# Patient Record
Sex: MALE | Race: BLACK OR AFRICAN AMERICAN | NOT HISPANIC OR LATINO | URBAN - METROPOLITAN AREA
[De-identification: names, ages, dates, MRNs, and addresses within clinical notes are randomized per-mention and may not be internally consistent; named-entity substitution may affect disease eponyms.]

---

## 2021-08-14 ENCOUNTER — HOSPITAL ENCOUNTER (EMERGENCY)
Facility: HOSPITAL | Age: 41
Discharge: HOME | End: 2021-08-14
Attending: FAMILY MEDICINE
Payer: MEDICAID

## 2021-08-14 VITALS
DIASTOLIC BLOOD PRESSURE: 76 MMHG | RESPIRATION RATE: 18 BRPM | HEART RATE: 59 BPM | TEMPERATURE: 99 F | HEIGHT: 73 IN | WEIGHT: 189 LBS | BODY MASS INDEX: 25.05 KG/M2 | SYSTOLIC BLOOD PRESSURE: 110 MMHG | OXYGEN SATURATION: 99 %

## 2021-08-14 DIAGNOSIS — U07.1 COVID-19 VIRUS DETECTED: ICD-10-CM

## 2021-08-14 DIAGNOSIS — U07.1 COVID-19 VIRUS DETECTED: Primary | ICD-10-CM

## 2021-08-14 LAB
CTP QC/QA: YES
SARS-COV-2 RDRP RESP QL NAA+PROBE: POSITIVE

## 2021-08-14 PROCEDURE — 99282 EMERGENCY DEPT VISIT SF MDM: CPT | Mod: ER

## 2021-08-14 PROCEDURE — U0002 COVID-19 LAB TEST NON-CDC: HCPCS | Mod: ER | Performed by: FAMILY MEDICINE

## 2022-06-26 ENCOUNTER — HOSPITAL ENCOUNTER (EMERGENCY)
Facility: HOSPITAL | Age: 42
Discharge: HOME | End: 2022-06-26
Attending: EMERGENCY MEDICINE
Payer: MEDICAID

## 2022-06-26 ENCOUNTER — HOSPITAL ENCOUNTER (OUTPATIENT)
Facility: HOSPITAL | Age: 42
Discharge: HOME | End: 2022-06-27
Attending: EMERGENCY MEDICINE | Admitting: ORTHOPAEDIC SURGERY
Payer: MEDICAID

## 2022-06-26 VITALS
OXYGEN SATURATION: 100 % | BODY MASS INDEX: 23.19 KG/M2 | SYSTOLIC BLOOD PRESSURE: 110 MMHG | WEIGHT: 175 LBS | RESPIRATION RATE: 17 BRPM | HEIGHT: 73 IN | TEMPERATURE: 98 F | HEART RATE: 49 BPM | DIASTOLIC BLOOD PRESSURE: 62 MMHG

## 2022-06-26 DIAGNOSIS — S52.501B TYPE I OR II OPEN FRACTURE OF DISTAL END OF RIGHT RADIUS, UNSPECIFIED FRACTURE MORPHOLOGY, INITIAL ENCOUNTER: Primary | ICD-10-CM

## 2022-06-26 DIAGNOSIS — S01.112A LACERATION OF LEFT EYEBROW, INITIAL ENCOUNTER: ICD-10-CM

## 2022-06-26 DIAGNOSIS — S52.591A OTHER CLOSED FRACTURE OF DISTAL END OF RIGHT RADIUS, INITIAL ENCOUNTER: Primary | ICD-10-CM

## 2022-06-26 DIAGNOSIS — S61.511A WRIST LACERATION, RIGHT, INITIAL ENCOUNTER: ICD-10-CM

## 2022-06-26 LAB
ALBUMIN SERPL BCP-MCNC: 4.5 G/DL (ref 3.5–5.2)
ALP SERPL-CCNC: 51 U/L (ref 38–126)
ALT SERPL W/O P-5'-P-CCNC: 30 U/L (ref 10–44)
ANION GAP SERPL CALC-SCNC: 9 MMOL/L (ref 8–16)
AST SERPL-CCNC: 45 U/L (ref 15–46)
BASOPHILS # BLD AUTO: 0.03 K/UL (ref 0–0.2)
BASOPHILS NFR BLD: 0.3 % (ref 0–1.9)
BILIRUB SERPL-MCNC: 0.4 MG/DL (ref 0.1–1)
CALCIUM SERPL-MCNC: 9 MG/DL (ref 8.7–10.5)
CHLORIDE SERPL-SCNC: 105 MMOL/L (ref 95–110)
CO2 SERPL-SCNC: 28 MMOL/L (ref 23–29)
CREAT SERPL-MCNC: 0.99 MG/DL (ref 0.5–1.4)
DIFFERENTIAL METHOD: ABNORMAL
EOSINOPHIL # BLD AUTO: 0.1 K/UL (ref 0–0.5)
EOSINOPHIL NFR BLD: 0.9 % (ref 0–8)
ERYTHROCYTE [DISTWIDTH] IN BLOOD BY AUTOMATED COUNT: 12 % (ref 11.5–14.5)
EST. GFR  (AFRICAN AMERICAN): >60 ML/MIN/1.73 M^2
EST. GFR  (NON AFRICAN AMERICAN): >60 ML/MIN/1.73 M^2
GLUCOSE SERPL-MCNC: 75 MG/DL (ref 70–110)
HCT VFR BLD AUTO: 37.8 % (ref 40–54)
HGB BLD-MCNC: 12.6 G/DL (ref 14–18)
IMM GRANULOCYTES # BLD AUTO: 0.04 K/UL (ref 0–0.04)
IMM GRANULOCYTES NFR BLD AUTO: 0.4 % (ref 0–0.5)
LYMPHOCYTES # BLD AUTO: 3 K/UL (ref 1–4.8)
LYMPHOCYTES NFR BLD: 33.3 % (ref 18–48)
MCH RBC QN AUTO: 30.5 PG (ref 27–31)
MCHC RBC AUTO-ENTMCNC: 33.3 G/DL (ref 32–36)
MCV RBC AUTO: 92 FL (ref 82–98)
MONOCYTES # BLD AUTO: 0.5 K/UL (ref 0.3–1)
MONOCYTES NFR BLD: 5.4 % (ref 4–15)
NEUTROPHILS # BLD AUTO: 5.3 K/UL (ref 1.8–7.7)
NEUTROPHILS NFR BLD: 59.7 % (ref 38–73)
NRBC BLD-RTO: 0 /100 WBC
PLATELET # BLD AUTO: 225 K/UL (ref 150–450)
PMV BLD AUTO: 10.5 FL (ref 9.2–12.9)
POTASSIUM SERPL-SCNC: 3.5 MMOL/L (ref 3.5–5.1)
PROT SERPL-MCNC: 7.2 G/DL (ref 6–8.4)
RBC # BLD AUTO: 4.13 M/UL (ref 4.6–6.2)
SARS-COV-2 RDRP RESP QL NAA+PROBE: NEGATIVE
SODIUM SERPL-SCNC: 142 MMOL/L (ref 136–145)
UUN UR-MCNC: 15 MG/DL (ref 2–20)
WBC # BLD AUTO: 8.89 K/UL (ref 3.9–12.7)

## 2022-06-26 PROCEDURE — 12011 RPR F/E/E/N/L/M 2.5 CM/<: CPT | Mod: ER

## 2022-06-26 PROCEDURE — G0378 HOSPITAL OBSERVATION PER HR: HCPCS

## 2022-06-26 PROCEDURE — U0002 COVID-19 LAB TEST NON-CDC: HCPCS | Mod: ER | Performed by: EMERGENCY MEDICINE

## 2022-06-26 PROCEDURE — 96375 TX/PRO/DX INJ NEW DRUG ADDON: CPT | Mod: 59

## 2022-06-26 PROCEDURE — 80053 COMPREHEN METABOLIC PANEL: CPT | Mod: ER | Performed by: EMERGENCY MEDICINE

## 2022-06-26 PROCEDURE — 12002 RPR S/N/AX/GEN/TRNK2.6-7.5CM: CPT

## 2022-06-26 PROCEDURE — 29105 APPLICATION LONG ARM SPLINT: CPT | Mod: RT,ER

## 2022-06-26 PROCEDURE — 96365 THER/PROPH/DIAG IV INF INIT: CPT | Mod: ER,59

## 2022-06-26 PROCEDURE — 99285 EMERGENCY DEPT VISIT HI MDM: CPT | Mod: 25,27,ER

## 2022-06-26 PROCEDURE — 96375 TX/PRO/DX INJ NEW DRUG ADDON: CPT | Mod: ER

## 2022-06-26 PROCEDURE — 99282 PR EMERGENCY DEPT VISIT,LEVEL II: ICD-10-PCS | Mod: ,,, | Performed by: EMERGENCY MEDICINE

## 2022-06-26 PROCEDURE — 63600175 PHARM REV CODE 636 W HCPCS: Mod: ER | Performed by: EMERGENCY MEDICINE

## 2022-06-26 PROCEDURE — 25000003 PHARM REV CODE 250: Mod: ER | Performed by: EMERGENCY MEDICINE

## 2022-06-26 PROCEDURE — 63600175 PHARM REV CODE 636 W HCPCS: Performed by: STUDENT IN AN ORGANIZED HEALTH CARE EDUCATION/TRAINING PROGRAM

## 2022-06-26 PROCEDURE — 25000003 PHARM REV CODE 250

## 2022-06-26 PROCEDURE — 99282 EMERGENCY DEPT VISIT SF MDM: CPT | Mod: ,,, | Performed by: EMERGENCY MEDICINE

## 2022-06-26 PROCEDURE — 90715 TDAP VACCINE 7 YRS/> IM: CPT | Mod: ER | Performed by: EMERGENCY MEDICINE

## 2022-06-26 PROCEDURE — 85025 COMPLETE CBC W/AUTO DIFF WBC: CPT | Mod: ER | Performed by: EMERGENCY MEDICINE

## 2022-06-26 PROCEDURE — 90471 IMMUNIZATION ADMIN: CPT | Mod: ER | Performed by: EMERGENCY MEDICINE

## 2022-06-26 PROCEDURE — 99285 EMERGENCY DEPT VISIT HI MDM: CPT | Mod: 25

## 2022-06-26 RX ORDER — ACETAMINOPHEN 325 MG/1
650 TABLET ORAL EVERY 8 HOURS PRN
Status: DISCONTINUED | OUTPATIENT
Start: 2022-06-26 | End: 2022-06-28 | Stop reason: HOSPADM

## 2022-06-26 RX ORDER — SODIUM CHLORIDE 0.9 % (FLUSH) 0.9 %
10 SYRINGE (ML) INJECTION
Status: DISCONTINUED | OUTPATIENT
Start: 2022-06-26 | End: 2022-06-28 | Stop reason: HOSPADM

## 2022-06-26 RX ORDER — SODIUM CHLORIDE 9 MG/ML
1000 INJECTION, SOLUTION INTRAVENOUS
Status: COMPLETED | OUTPATIENT
Start: 2022-06-26 | End: 2022-06-26

## 2022-06-26 RX ORDER — CEFAZOLIN SODIUM 1 G/50ML
1 SOLUTION INTRAVENOUS
Status: COMPLETED | OUTPATIENT
Start: 2022-06-26 | End: 2022-06-26

## 2022-06-26 RX ORDER — FENTANYL CITRATE 50 UG/ML
25 INJECTION, SOLUTION INTRAMUSCULAR; INTRAVENOUS
Status: COMPLETED | OUTPATIENT
Start: 2022-06-26 | End: 2022-06-26

## 2022-06-26 RX ORDER — LIDOCAINE HYDROCHLORIDE 10 MG/ML
10 INJECTION, SOLUTION EPIDURAL; INFILTRATION; INTRACAUDAL; PERINEURAL
Status: COMPLETED | OUTPATIENT
Start: 2022-06-26 | End: 2022-06-26

## 2022-06-26 RX ORDER — LIDOCAINE HYDROCHLORIDE 10 MG/ML
1 INJECTION, SOLUTION EPIDURAL; INFILTRATION; INTRACAUDAL; PERINEURAL ONCE
Status: COMPLETED | OUTPATIENT
Start: 2022-06-27 | End: 2022-06-27

## 2022-06-26 RX ORDER — ONDANSETRON 2 MG/ML
4 INJECTION INTRAMUSCULAR; INTRAVENOUS
Status: COMPLETED | OUTPATIENT
Start: 2022-06-26 | End: 2022-06-26

## 2022-06-26 RX ORDER — ONDANSETRON 8 MG/1
8 TABLET, ORALLY DISINTEGRATING ORAL EVERY 8 HOURS PRN
Status: DISCONTINUED | OUTPATIENT
Start: 2022-06-26 | End: 2022-06-28 | Stop reason: HOSPADM

## 2022-06-26 RX ORDER — OXYCODONE HYDROCHLORIDE 5 MG/1
5 TABLET ORAL EVERY 4 HOURS PRN
Status: DISCONTINUED | OUTPATIENT
Start: 2022-06-26 | End: 2022-06-28 | Stop reason: HOSPADM

## 2022-06-26 RX ORDER — TALC
6 POWDER (GRAM) TOPICAL NIGHTLY PRN
Status: DISCONTINUED | OUTPATIENT
Start: 2022-06-26 | End: 2022-06-28 | Stop reason: HOSPADM

## 2022-06-26 RX ORDER — ACETAMINOPHEN 325 MG/1
650 TABLET ORAL EVERY 4 HOURS PRN
Status: DISCONTINUED | OUTPATIENT
Start: 2022-06-26 | End: 2022-06-28 | Stop reason: HOSPADM

## 2022-06-26 RX ORDER — OXYCODONE HYDROCHLORIDE 10 MG/1
10 TABLET ORAL EVERY 4 HOURS PRN
Status: DISCONTINUED | OUTPATIENT
Start: 2022-06-26 | End: 2022-06-28 | Stop reason: HOSPADM

## 2022-06-26 RX ORDER — LIDOCAINE HYDROCHLORIDE 10 MG/ML
10 INJECTION, SOLUTION EPIDURAL; INFILTRATION; INTRACAUDAL; PERINEURAL
Status: DISPENSED | OUTPATIENT
Start: 2022-06-26 | End: 2022-06-27

## 2022-06-26 RX ORDER — MORPHINE SULFATE 4 MG/ML
4 INJECTION, SOLUTION INTRAMUSCULAR; INTRAVENOUS EVERY 4 HOURS PRN
Status: DISCONTINUED | OUTPATIENT
Start: 2022-06-26 | End: 2022-06-28 | Stop reason: HOSPADM

## 2022-06-26 RX ORDER — MORPHINE SULFATE 4 MG/ML
4 INJECTION, SOLUTION INTRAMUSCULAR; INTRAVENOUS
Status: COMPLETED | OUTPATIENT
Start: 2022-06-26 | End: 2022-06-26

## 2022-06-26 RX ORDER — CEFAZOLIN SODIUM/D5W 2 G/100 ML
2 PLASTIC BAG, INJECTION (ML) INTRAVENOUS
Status: COMPLETED | OUTPATIENT
Start: 2022-06-27 | End: 2022-06-27

## 2022-06-26 RX ORDER — LIDOCAINE HYDROCHLORIDE 10 MG/ML
20 INJECTION INFILTRATION; PERINEURAL ONCE
Status: DISCONTINUED | OUTPATIENT
Start: 2022-06-26 | End: 2022-06-26 | Stop reason: SDUPTHER

## 2022-06-26 RX ADMIN — MORPHINE SULFATE 4 MG: 4 INJECTION INTRAVENOUS at 06:06

## 2022-06-26 RX ADMIN — SODIUM CHLORIDE 1000 ML: 0.9 INJECTION, SOLUTION INTRAVENOUS at 06:06

## 2022-06-26 RX ADMIN — ONDANSETRON HYDROCHLORIDE 4 MG: 2 INJECTION, SOLUTION INTRAMUSCULAR; INTRAVENOUS at 06:06

## 2022-06-26 RX ADMIN — CEFAZOLIN SODIUM 1 G: 1 SOLUTION INTRAVENOUS at 07:06

## 2022-06-26 RX ADMIN — FENTANYL CITRATE 25 MCG: 50 INJECTION INTRAMUSCULAR; INTRAVENOUS at 09:06

## 2022-06-26 RX ADMIN — LIDOCAINE HYDROCHLORIDE 100 MG: 10 INJECTION, SOLUTION EPIDURAL; INFILTRATION; INTRACAUDAL at 06:06

## 2022-06-26 RX ADMIN — TETANUS TOXOID, REDUCED DIPHTHERIA TOXOID AND ACELLULAR PERTUSSIS VACCINE, ADSORBED 0.5 ML: 5; 2.5; 8; 8; 2.5 SUSPENSION INTRAMUSCULAR at 07:06

## 2022-06-26 RX ADMIN — OXYCODONE HYDROCHLORIDE 10 MG: 10 TABLET ORAL at 11:06

## 2022-06-26 NOTE — ED PROVIDER NOTES
"Encounter Date: 6/26/2022       History     Chief Complaint   Patient presents with    Laceration     Pt states "I was at work and I wasn't even using the . I just turned it on and it's a big wheel and it cut me." Pt with laceration to left eyebrow and right wrist. Denies using blood thinners. Pt diaphoretic. Pt AAOX4. 2+ pulses noted. Pressure dressing noted.      Patient is a 42-year-old male presents to the ED with complaint of laceration.  Patient states that he was using a  that "got away from me" and allegedly struck him to the left eyebrow and the right wrist approximately 20 minutes prior to arrival. He denies any vision change, eye pain or FB sensatin to the L eye. He does endorse pain and laceration to the distal R forearm but he denies any numbness or tingling or an inability to move his R wrist or the fingers on his R hand. He does not recall when he received his last tetanus. He denies any other complaints. The patient denies any use of blood thinning medications, significant pmhx.         Review of patient's allergies indicates:  No Known Allergies  History reviewed. No pertinent past medical history.  History reviewed. No pertinent surgical history.  History reviewed. No pertinent family history.  Social History     Tobacco Use    Smoking status: Current Every Day Smoker     Packs/day: 1.00    Smokeless tobacco: Never Used   Substance Use Topics    Alcohol use: Never    Drug use: Yes     Types: Marijuana     Review of Systems   Constitutional: Negative for chills and fever.   Eyes: Negative for photophobia, pain, discharge, redness, itching and visual disturbance.   Skin: Positive for wound.   Neurological: Negative for dizziness, weakness and numbness.       Physical Exam     Initial Vitals [06/26/22 1815]   BP Pulse Resp Temp SpO2   111/71 (!) 58 (!) 22 98.5 °F (36.9 °C) 96 %      MAP       --         Physical Exam    Constitutional: He appears well-developed and well-nourished. "   HENT:   Head: Normocephalic.       Approximate 2 cm linear laceration superior to the L orbit; well-approximated   Eyes: Conjunctivae and EOM are normal. Pupils are equal, round, and reactive to light.   Neck: Neck supple.   Normal range of motion.  Musculoskeletal:        Arms:       Cervical back: Normal range of motion and neck supple.      Comments: Approximate 5 cm linear laceration noted to the R wrist; no active bleeding;  no tendon involvement; scant muscle tear; no FB; the R radial pulse is 2+; patient able to make okay sign and thumbs up sign; 2 point discrimination intact     Neurological: He is alert and oriented to person, place, and time. He has normal strength. GCS score is 15. GCS eye subscore is 4. GCS verbal subscore is 5. GCS motor subscore is 6.         ED Course   Procedures  Labs Reviewed   CBC W/ AUTO DIFFERENTIAL - Abnormal; Notable for the following components:       Result Value    RBC 4.13 (*)     Hemoglobin 12.6 (*)     Hematocrit 37.8 (*)     All other components within normal limits   COMPREHENSIVE METABOLIC PANEL   SARS-COV-2 RNA AMPLIFICATION, QUAL          Imaging Results          X-Ray Wrist Complete Right (Final result)  Result time 06/26/22 18:45:29    Final result by Damián Walker MD (06/26/22 18:45:29)                 Impression:      As above      Electronically signed by: Aaron Steel  Date:    06/26/2022  Time:    18:45             Narrative:    EXAMINATION:  XR WRIST COMPLETE 3 VIEWS RIGHT    CLINICAL HISTORY:  Laceration without foreign body of right wrist, initial encounter    TECHNIQUE:  PA, lateral, and oblique views of the right wrist were performed.    COMPARISON:  None    FINDINGS:  Comminuted fracture of the distal radius along the radial side.                                 Medications   ceFAZolin (ANCEF) 1 gram in dextrose 5 % 50 mL IVPB (premix) (1 g Intravenous New Bag 6/26/22 1912)   Tdap (BOOSTRIX) vaccine injection 0.5 mL (has no administration in  time range)   0.9%  NaCl infusion (1,000 mLs Intravenous New Bag 6/26/22 1847)   morphine injection 4 mg (4 mg Intravenous Given 6/26/22 1849)   ondansetron injection 4 mg (4 mg Intravenous Given 6/26/22 1849)   LIDOcaine (PF) 10 mg/ml (1%) injection 100 mg (100 mg Infiltration Given 6/26/22 1855)     Medical Decision Making:   Clinical Tests:   Lab Tests: Ordered and Reviewed  Radiological Study: Reviewed and Ordered  ED Management:  - patient presents to the ED with laceration to the left eyebrow and right wrist status post  accident; patient states that a  cut him to the right radius and the left eyebrow; the patient has an approximate 5 cm laceration to the distal right radius; plain radiograph of the right wrist demonstrates a comminuted distal radial fracture; as this is an open fracture, patient will need to be evaluated by Orthopedic surgery; at the present time, we are without orthopedic surgery coverage in the [facility] and at [facility]; discussed case with the [facility] referral center opening touch with the current on-call orthopedic surgeon, Dr. Martell, who has accept the patient as a ED to ED transfer; facial laceration repaired; patient administered Ancef; right forearm laceration left open but irrigated and dressed appropriately; splint applied prior to transfer; patient stable for transfer; he is in agreement plan for transfer and further evaluation Orthopedic surgery as indicated above  Other:   I have discussed this case with another health care provider.       <> Summary of the Discussion: Open fx; discussed with orthopedic sx.              ED Course as of 06/26/22 1932   [place] Jun 26, 2022   1901 Discussed case with on-call orthopedic surgery, Dr. Martell, who will accept the patient as an ED-to-ED transfer  [LC]      ED Course User Index  [LC] Andrew Harrison MD             Clinical Impression:   Final diagnoses:  [A97.452A] Wrist laceration, right,  initial encounter  [S01.112A] Laceration of left eyebrow, initial encounter  [S52.591A] Other closed fracture of distal end of right radius, initial encounter (Primary)          ED Disposition Condition    Transfer to Another Facility Stable                     Andrew Harrison MD  06/26/22 1932       Andrew Harrison MD  06/26/22 1933

## 2022-06-27 ENCOUNTER — TELEPHONE (OUTPATIENT)
Dept: SPORTS MEDICINE | Facility: CLINIC | Age: 42
End: 2022-06-27
Payer: MEDICAID

## 2022-06-27 VITALS
HEIGHT: 73 IN | WEIGHT: 174.19 LBS | TEMPERATURE: 97 F | RESPIRATION RATE: 17 BRPM | HEART RATE: 45 BPM | SYSTOLIC BLOOD PRESSURE: 113 MMHG | OXYGEN SATURATION: 100 % | BODY MASS INDEX: 23.09 KG/M2 | DIASTOLIC BLOOD PRESSURE: 67 MMHG

## 2022-06-27 PROBLEM — S52.501B OPEN FRACTURE OF DISTAL END OF RIGHT RADIUS: Status: ACTIVE | Noted: 2022-06-27

## 2022-06-27 PROCEDURE — G0378 HOSPITAL OBSERVATION PER HR: HCPCS

## 2022-06-27 PROCEDURE — 96365 THER/PROPH/DIAG IV INF INIT: CPT | Performed by: EMERGENCY MEDICINE

## 2022-06-27 PROCEDURE — 25000003 PHARM REV CODE 250

## 2022-06-27 PROCEDURE — 63600175 PHARM REV CODE 636 W HCPCS

## 2022-06-27 PROCEDURE — 96366 THER/PROPH/DIAG IV INF ADDON: CPT | Performed by: EMERGENCY MEDICINE

## 2022-06-27 RX ORDER — METHOCARBAMOL 500 MG/1
500 TABLET, FILM COATED ORAL 4 TIMES DAILY
Status: DISCONTINUED | OUTPATIENT
Start: 2022-06-27 | End: 2022-06-28 | Stop reason: HOSPADM

## 2022-06-27 RX ORDER — HYDROCODONE BITARTRATE AND ACETAMINOPHEN 5; 325 MG/1; MG/1
1 TABLET ORAL EVERY 6 HOURS PRN
Qty: 12 TABLET | Refills: 0 | Status: SHIPPED | OUTPATIENT
Start: 2022-06-27

## 2022-06-27 RX ORDER — SULFAMETHOXAZOLE AND TRIMETHOPRIM 800; 160 MG/1; MG/1
1 TABLET ORAL 2 TIMES DAILY
Qty: 14 TABLET | Refills: 0 | Status: SHIPPED | OUTPATIENT
Start: 2022-06-27 | End: 2022-07-04

## 2022-06-27 RX ADMIN — Medication 2 G: at 02:06

## 2022-06-27 RX ADMIN — OXYCODONE HYDROCHLORIDE 10 MG: 10 TABLET ORAL at 03:06

## 2022-06-27 RX ADMIN — Medication 2 G: at 12:06

## 2022-06-27 RX ADMIN — OXYCODONE HYDROCHLORIDE 10 MG: 10 TABLET ORAL at 08:06

## 2022-06-27 RX ADMIN — METHOCARBAMOL 500 MG: 500 TABLET ORAL at 08:06

## 2022-06-27 RX ADMIN — METHOCARBAMOL 500 MG: 500 TABLET ORAL at 12:06

## 2022-06-27 RX ADMIN — Medication 2 G: at 08:06

## 2022-06-27 RX ADMIN — METHOCARBAMOL 500 MG: 500 TABLET ORAL at 05:06

## 2022-06-27 RX ADMIN — OXYCODONE HYDROCHLORIDE 10 MG: 10 TABLET ORAL at 09:06

## 2022-06-27 RX ADMIN — METHOCARBAMOL 500 MG: 500 TABLET ORAL at 09:06

## 2022-06-27 RX ADMIN — LIDOCAINE HYDROCHLORIDE 10 MG: 10 INJECTION, SOLUTION EPIDURAL; INFILTRATION; INTRACAUDAL; PERINEURAL at 12:06

## 2022-06-27 NOTE — ASSESSMENT & PLAN NOTE
Thomas Caal is a 42 y.o. male with a grade 2 open fracture of his right distal radius.  He is neurovascularly intact.  He received Ancef and a tetanus booster at the outside hospital.  He underwent irrigation of his wound at the outside hospital prior to transfer.  He does not underwent irrigation of his wound at the [facility] ED followed by primary closure.  A volar slab splint was then applied.  See procedure note below for additional details.  The patient has a grade 2 open fracture and will be admitted to the orthopedic service for 24 hours of IV antibiotics.    Plan:  Pain control:  multimodal  Antibiotics:  24 hours of Ancef  PT/OT:  Nonweightbearing to the right upper extremity  Diet:  NPO at midnight    Dispo:  Will discuss the volar fracture fragment with the hand staff, otherwise patient okay for discharge home after 24 hours antibiotics beginning after wound closure    Procedure note:  Right wrist wound repair volar slab splint application  After time out was performed and patient ID, side, and site were verified, the right wrist was sterilly prepped in the standard fashion.  A 22-gauge needle was introduced into the laceration without complication and 20 cc of 1% lidocaine was then injected without difficulty.  After adequate analgesia was obtained, the laceration was thoroughly irrigated with Betadine and 1 L of normal saline. At this point, the wound was primarily closed using 4 simple interrupted sutures of 3-0 nylon.  He was then placed into a volar slab splint for immobilization.  The patient tolerated the procedure well with no complications.  Blood loss was minimal.

## 2022-06-27 NOTE — ASSESSMENT & PLAN NOTE
Thomas Caal is a 42 y.o. male with a grade 2 open fracture of his right distal radius.  He is neurovascularly intact.  He received Ancef and a tetanus booster at the outside hospital.  He underwent irrigation of his wound at the outside hospital prior to transfer.  He does not underwent irrigation of his wound at the [facility] ED followed by primary closure.  A volar slab splint was then applied.  See procedure note below for additional details.  The patient has a grade 2 open fracture and will be admitted to the orthopedic service for 24 hours of IV antibiotics. Pain well controlled after admission. Patient tolerating Ancef well.    Plan:  Pain control:  multimodal  Antibiotics:  24 hours of Ancef after laceration closed  PT/OT:  Nonweightbearing to the right upper extremity  Diet:  NPO since midnight     Dispo:  Will discuss the volar fracture fragment with the hand staff, otherwise patient okay for discharge home after 24 hours antibiotics beginning after wound closure

## 2022-06-27 NOTE — SUBJECTIVE & OBJECTIVE
"Principal Problem:Open fracture of distal end of right radius    Principal Orthopedic Problem: same    Interval History: NAEON. VSS. Pain well controlled after laceration repair and splint application. Ancef being administered.    Review of patient's allergies indicates:  No Known Allergies    Current Facility-Administered Medications   Medication    acetaminophen tablet 650 mg    acetaminophen tablet 650 mg    ceFAZolin 2 gram in dextrose 5% 100 mL IVPB (premix)    LIDOcaine (PF) 10 mg/ml (1%) injection 100 mg    melatonin tablet 6 mg    methocarbamoL tablet 500 mg    morphine injection 4 mg    ondansetron disintegrating tablet 8 mg    oxyCODONE immediate release tablet 5 mg    oxyCODONE immediate release tablet Tab 10 mg    sodium chloride 0.9% flush 10 mL     Objective:     Vital Signs (Most Recent):  Temp: 96.5 °F (35.8 °C) (06/27/22 0503)  Pulse: (!) 46 (06/27/22 0503)  Resp: 16 (06/27/22 0503)  BP: (!) 96/56 (06/27/22 0503)  SpO2: 96 % (06/27/22 0503)   Vital Signs (24h Range):  Temp:  [96.5 °F (35.8 °C)-98.6 °F (37 °C)] 96.5 °F (35.8 °C)  Pulse:  [46-58] 46  Resp:  [16-22] 16  SpO2:  [96 %-100 %] 96 %  BP: ()/(56-78) 96/56     Weight: 79 kg (174 lb 2.6 oz)  Height: 6' 1" (185.4 cm)  Body mass index is 22.98 kg/m².    No intake or output data in the 24 hours ending 06/27/22 0538    Ortho/SPM Exam  Gen: NAD, sitting comfortably in bed  CV: regular rate  Resp: non-labored respirations    RUE:  Volar slab splint in place; clean, dry, and intact  Neurovascularly intact distally    Significant Labs: All pertinent labs within the past 24 hours have been reviewed.    Significant Imaging: I have reviewed and interpreted all pertinent imaging results/findings.  "

## 2022-06-27 NOTE — NURSING
Pt admitted to room 545 from ED with dx of rt wrist fx.  Pt is alert and oriented x 4.  Rt wrist is wrapped with cast padding and ace bandage per ortho in ED.  Pt has small laceration to left eye that has 3 sutures.  VSS Instr pt not to eat or drink anything.  Instr pt to use call light for any assistance.  Pt verbalized understanding.

## 2022-06-27 NOTE — ED NOTES
Received a call from [facility] transfer center  Pt accepted by Dr Martell  Pt will be transferred to [facility] ER  Call report [phone]  EMS set up STAT per [facility]

## 2022-06-27 NOTE — PROGRESS NOTES
"[facility] - Surgery  Orthopedics  Progress Note    Patient Name: Thomas Caal  MRN: [id number]  Admission Date: 6/26/2022  Hospital Length of Stay: 0 days  Attending Provider: Nii Cerrato,*  Primary Care Provider: Primary Doctor No    Subjective:     Principal Problem:Open fracture of distal end of right radius    Principal Orthopedic Problem: same    Interval History: NAEON. VSS. Pain well controlled after laceration repair and splint application. Ancef being administered.    Review of patient's allergies indicates:  No Known Allergies    Current Facility-Administered Medications   Medication    acetaminophen tablet 650 mg    acetaminophen tablet 650 mg    ceFAZolin 2 gram in dextrose 5% 100 mL IVPB (premix)    LIDOcaine (PF) 10 mg/ml (1%) injection 100 mg    melatonin tablet 6 mg    methocarbamoL tablet 500 mg    morphine injection 4 mg    ondansetron disintegrating tablet 8 mg    oxyCODONE immediate release tablet 5 mg    oxyCODONE immediate release tablet Tab 10 mg    sodium chloride 0.9% flush 10 mL     Objective:     Vital Signs (Most Recent):  Temp: 96.5 °F (35.8 °C) (06/27/22 0503)  Pulse: (!) 46 (06/27/22 0503)  Resp: 16 (06/27/22 0503)  BP: (!) 96/56 (06/27/22 0503)  SpO2: 96 % (06/27/22 0503)   Vital Signs (24h Range):  Temp:  [96.5 °F (35.8 °C)-98.6 °F (37 °C)] 96.5 °F (35.8 °C)  Pulse:  [46-58] 46  Resp:  [16-22] 16  SpO2:  [96 %-100 %] 96 %  BP: ()/(56-78) 96/56     Weight: 79 kg (174 lb 2.6 oz)  Height: 6' 1" (185.4 cm)  Body mass index is 22.98 kg/m².    No intake or output data in the 24 hours ending 06/27/22 0538    Ortho/SPM Exam  Gen: NAD, sitting comfortably in bed  CV: regular rate  Resp: non-labored respirations    RUE:  Volar slab splint in place; clean, dry, and intact  Neurovascularly intact distally    Significant Labs: All pertinent labs within the past 24 hours have been reviewed.    Significant Imaging: I have reviewed and interpreted all pertinent imaging " results/findings.    Assessment/Plan:     * Open fracture of distal end of right radius  Thomas Caal is a 42 y.o. male with a grade 2 open fracture of his right distal radius.  He is neurovascularly intact.  He received Ancef and a tetanus booster at the outside hospital.  He underwent irrigation of his wound at the outside hospital prior to transfer.  He does not underwent irrigation of his wound at the [facility] ED followed by primary closure.  A volar slab splint was then applied.  See procedure note below for additional details.  The patient has a grade 2 open fracture and will be admitted to the orthopedic service for 24 hours of IV antibiotics. Pain well controlled after admission. Patient tolerating Ancef well.    Plan:  Pain control:  multimodal  Antibiotics:  24 hours of Ancef after laceration closed  PT/OT:  Nonweightbearing to the right upper extremity  Diet:  NPO since midnight     Dispo:  Will discuss the volar fracture fragment with the hand staff, otherwise patient okay for discharge home after 24 hours antibiotics beginning after wound closure          Yonas Taylor MD  Orthopedics  [facility] - Surgery

## 2022-06-27 NOTE — SUBJECTIVE & OBJECTIVE
"No past medical history on file.    No past surgical history on file.    Review of patient's allergies indicates:  No Known Allergies    Current Facility-Administered Medications   Medication    acetaminophen tablet 650 mg    acetaminophen tablet 650 mg    ceFAZolin 2 gram in dextrose 5% 100 mL IVPB (premix)    LIDOcaine (PF) 10 mg/ml (1%) injection 100 mg    melatonin tablet 6 mg    morphine injection 4 mg    ondansetron disintegrating tablet 8 mg    oxyCODONE immediate release tablet 5 mg    oxyCODONE immediate release tablet Tab 10 mg    sodium chloride 0.9% flush 10 mL     Family History    None       Tobacco Use    Smoking status: Current Every Day Smoker     Packs/day: 1.00    Smokeless tobacco: Never Used   Substance and Sexual Activity    Alcohol use: Never    Drug use: Yes     Types: Marijuana    Sexual activity: Not on file     ROS  Constitutional: Denies fever/chills   Neurological: Denies numbness/tingling (any exceptions noted in orthopaedic exam)    Psychiatric/Behavioral: Denies change in normal mood   Eyes: Denies change in vision   Cardiovascular: Denies chest pain   Respiratory: Denies shortness of breath   Hematologic/Lymphatic: Denies easy bleeding/bruising    Skin: Denies new rash or skin lesions    Gastrointestinal: Denies nausea/vomitting/diarrhea, change in bowel habits, abdominal pain    Allergic/Immunologic: Denies adverse reactions to current medications   Musculoskeletal: see HPI ]  Objective:     Vital Signs (Most Recent):  Temp: 97.4 °F (36.3 °C) (06/27/22 0052)  Pulse: (!) 54 (06/27/22 0052)  Resp: 17 (06/27/22 0052)  BP: 133/64 (06/27/22 0052)  SpO2: 100 % (06/27/22 0052)   Vital Signs (24h Range):  Temp:  [97.4 °F (36.3 °C)-98.6 °F (37 °C)] 97.4 °F (36.3 °C)  Pulse:  [49-58] 54  Resp:  [17-22] 17  SpO2:  [96 %-100 %] 100 %  BP: (110-133)/(62-78) 133/64     Weight: 79 kg (174 lb 2.6 oz)  Height: 6' 1" (185.4 cm)  Body mass index is 22.98 kg/m².    No intake or output data in the 24 " hours ending 06/27/22 0113    Ortho/SPM Exam  General: no acute distress, appears stated age     Neuro: alert and oriented x3   Psych: normal mood   Head: normocephalic, repair laceration over left eyebrow, bleeding controlled  Eyes: no scleral icterus   Mouth: moist mucous membranes   Cardiovascular: extremities warm and well perfused   Lungs: breathing comfortably, equal chest rise bilat   Skin: clean, dry, intact (any exceptions noted in below musculoskeletal exam)    Musculoskeletal:  LUE:  - Skin intact throughout, no open wounds  - No swelling  - No ecchymosis, erythema, or signs of cellulitis  - NonTTP throughout  - AROM and PROM of the shoulder, elbow, wrist, and hand intact without pain  - Axillary/AIN/PIN/Radial/Median/Ulnar nerves assessed in isolation and are without deficit  - SILT throughout  - Compartments soft  - 2+ radial artery pulse  - Capillary Refill < 2 sec    RUE:  - 2 cm longitudinal wound over the distal radial aspect of the forearm with no pulsatile bleeding and no exposed tendon  No edema  - TTP over the volar aspect of distal radius  - ROM wrist limited secondary to pain  - AROM and PROM of the shoulder, elbow, and hand intact without pain  - Axillary/AIN/PIN/Radial/Median/Ulnar nerves assessed in isolation and are without deficit  - SILT throughout  - Compartments soft  - 2+ radial artery pulse  - Capillary Refill < 2 sec    LLE:  - Skin intact throughout, no open wounds  - No swelling  - No ecchymosis, erythema, or signs of cellulitis  - NonTTP throughout  - AROM and PROM of the hip, knee, ankle, and foot intact without pain  - TA/EHL/Gastroc/FHL assessed in isolation and are without deficit  - SILT throughout  - Compartments soft  - 2+ DP and PT pulses  - Capillary Refill < 2 sec  - Negative Log roll    RLE:  - Skin intact throughout, no open wounds  - No swelling  - No ecchymosis, erythema, or signs of cellulitis  - NonTTP throughout  - AROM and PROM of the hip, knee, ankle, and foot  intact without pain  - TA/EHL/Gastroc/FHL assessed in isolation and are without deficit  - SILT throughout  - Compartments soft  - 2+ DP and PT pulses  - Capillary Refill < 2 sec  - Negative Log roll    Spine/pelvis/axial body:  No tenderness to palpation of cervical, thoracic, or lumbar spine  No pain with compression of pelvis    Significant Labs: All pertinent labs within the past 24 hours have been reviewed.    Significant Imaging: I have reviewed and interpreted all pertinent imaging results/findings.  X-ray right wrist with a mildly displaced distal radius fracture, volar fragment

## 2022-06-27 NOTE — H&P
[facility] - [facility]  Orthopedics  H&P    Patient Name: Thomas Caal  MRN: [id number]  Admission Date: 6/26/2022  Primary Care Provider: Primary Doctor No    Patient information was obtained from patient and ER records.     Subjective:     Principal Problem:Open fracture of distal end of right radius    Chief Complaint:   Chief Complaint   Patient presents with    Transfer     [facility] for ortho; distal radius fx        HPI: Thomas Caal is a 42 y.o. left hand dominant healthy male transferred to the [facility] ED for evaluation of a right wrist injury.  The patient reports he was working at a job site the evening of presentation when the a disc on a  broke and 1 of the fragments flew off and hit his wrist and face.  He had immediate pain and noticed wounds to his right wrist and left eyebrow.  He presented to an outside emergency department for evaluation.  X-rays there showed a displaced fracture right distal radius.  Exam there showed a wound over the radial aspect of the forearm and no pulsatile bleeding.  He received Ancef and a tetanus booster at the outside ED.  The patient was transferred to the [facility] ED for evaluation by Orthopedic surgery.  The patient denies numbness, tingling, and weakness in his right upper extremity.  Other than his eyebrow laceration, he denies other injuries.    Orthopedics is consulted for evaluation of the right open distal radius fracture.      No past medical history on file.    No past surgical history on file.    Review of patient's allergies indicates:  No Known Allergies    Current Facility-Administered Medications   Medication    acetaminophen tablet 650 mg    acetaminophen tablet 650 mg    ceFAZolin 2 gram in dextrose 5% 100 mL IVPB (premix)    LIDOcaine (PF) 10 mg/ml (1%) injection 100 mg    melatonin tablet 6 mg    morphine injection 4 mg    ondansetron disintegrating tablet 8 mg    oxyCODONE immediate release tablet 5 mg    oxyCODONE immediate release tablet  "Tab 10 mg    sodium chloride 0.9% flush 10 mL     Family History    None       Tobacco Use    Smoking status: Current Every Day Smoker     Packs/day: 1.00    Smokeless tobacco: Never Used   Substance and Sexual Activity    Alcohol use: Never    Drug use: Yes     Types: Marijuana    Sexual activity: Not on file     ROS  Constitutional: Denies fever/chills   Neurological: Denies numbness/tingling (any exceptions noted in orthopaedic exam)    Psychiatric/Behavioral: Denies change in normal mood   Eyes: Denies change in vision   Cardiovascular: Denies chest pain   Respiratory: Denies shortness of breath   Hematologic/Lymphatic: Denies easy bleeding/bruising    Skin: Denies new rash or skin lesions    Gastrointestinal: Denies nausea/vomitting/diarrhea, change in bowel habits, abdominal pain    Allergic/Immunologic: Denies adverse reactions to current medications   Musculoskeletal: see HPI ]  Objective:     Vital Signs (Most Recent):  Temp: 97.4 °F (36.3 °C) (06/27/22 0052)  Pulse: (!) 54 (06/27/22 0052)  Resp: 17 (06/27/22 0052)  BP: 133/64 (06/27/22 0052)  SpO2: 100 % (06/27/22 0052)   Vital Signs (24h Range):  Temp:  [97.4 °F (36.3 °C)-98.6 °F (37 °C)] 97.4 °F (36.3 °C)  Pulse:  [49-58] 54  Resp:  [17-22] 17  SpO2:  [96 %-100 %] 100 %  BP: (110-133)/(62-78) 133/64     Weight: 79 kg (174 lb 2.6 oz)  Height: 6' 1" (185.4 cm)  Body mass index is 22.98 kg/m².    No intake or output data in the 24 hours ending 06/27/22 0113    Ortho/SPM Exam  General: no acute distress, appears stated age     Neuro: alert and oriented x3   Psych: normal mood   Head: normocephalic, repair laceration over left eyebrow, bleeding controlled  Eyes: no scleral icterus   Mouth: moist mucous membranes   Cardiovascular: extremities warm and well perfused   Lungs: breathing comfortably, equal chest rise bilat   Skin: clean, dry, intact (any exceptions noted in below musculoskeletal exam)    Musculoskeletal:  LUE:  - Skin intact throughout, no " open wounds  - No swelling  - No ecchymosis, erythema, or signs of cellulitis  - NonTTP throughout  - AROM and PROM of the shoulder, elbow, wrist, and hand intact without pain  - Axillary/AIN/PIN/Radial/Median/Ulnar nerves assessed in isolation and are without deficit  - SILT throughout  - Compartments soft  - 2+ radial artery pulse  - Capillary Refill < 2 sec    RUE:  - 2 cm longitudinal wound over the distal radial aspect of the forearm with no pulsatile bleeding and no exposed tendon  No edema  - TTP over the volar aspect of distal radius  - ROM wrist limited secondary to pain  - AROM and PROM of the shoulder, elbow, and hand intact without pain  - Axillary/AIN/PIN/Radial/Median/Ulnar nerves assessed in isolation and are without deficit  - SILT throughout  - Compartments soft  - 2+ radial artery pulse  - Capillary Refill < 2 sec    LLE:  - Skin intact throughout, no open wounds  - No swelling  - No ecchymosis, erythema, or signs of cellulitis  - NonTTP throughout  - AROM and PROM of the hip, knee, ankle, and foot intact without pain  - TA/EHL/Gastroc/FHL assessed in isolation and are without deficit  - SILT throughout  - Compartments soft  - 2+ DP and PT pulses  - Capillary Refill < 2 sec  - Negative Log roll    RLE:  - Skin intact throughout, no open wounds  - No swelling  - No ecchymosis, erythema, or signs of cellulitis  - NonTTP throughout  - AROM and PROM of the hip, knee, ankle, and foot intact without pain  - TA/EHL/Gastroc/FHL assessed in isolation and are without deficit  - SILT throughout  - Compartments soft  - 2+ DP and PT pulses  - Capillary Refill < 2 sec  - Negative Log roll    Spine/pelvis/axial body:  No tenderness to palpation of cervical, thoracic, or lumbar spine  No pain with compression of pelvis    Significant Labs: All pertinent labs within the past 24 hours have been reviewed.    Significant Imaging: I have reviewed and interpreted all pertinent imaging results/findings.  X-ray right  wrist with a mildly displaced distal radius fracture, volar fragment    Assessment/Plan:     * Open fracture of distal end of right radius  Thomas Caal is a 42 y.o. male with a grade 2 open fracture of his right distal radius.  He is neurovascularly intact.  He received Ancef and a tetanus booster at the outside hospital.  He underwent irrigation of his wound at the outside hospital prior to transfer.  He does not underwent irrigation of his [place] [facility] [place] followed by primary closure.  A volar slab splint was then applied.  See procedure note below for additional details.  The patient has a grade 2 open fracture and will be admitted to the orthopedic service for 24 hours of IV antibiotics.    Plan:  Pain control:  multimodal  Antibiotics:  24 hours of Ancef  PT/OT:  Nonweightbearing to the right upper extremity  Diet:  NPO at midnight    Dispo:  Will discuss the volar fracture fragment with the hand staff, otherwise patient okay for discharge home after 24 hours antibiotics beginning after wound closure    Procedure note:  Right wrist wound repair volar slab splint application  After time out was performed and patient ID, side, and site were verified, the right wrist was sterilly prepped in the standard fashion.  A 22-gauge needle was introduced into the laceration without complication and 20 cc of 1% lidocaine was then injected without difficulty.  After adequate analgesia was obtained, the laceration was thoroughly irrigated with Betadine and 1 L of normal saline. At this point, the wound was primarily closed using 4 simple interrupted sutures of 3-0 nylon.  He was then placed into a volar slab splint for immobilization.  The patient tolerated the procedure well with no complications.  Blood loss was minimal.            Yonas Taylor MD  Orthopedics  [facility] - Surgery

## 2022-06-27 NOTE — PLAN OF CARE
Pt resting in bed comfortably. PIV line intact and free of infection and irritation. Fall precautions maintained, no falls noted. Call light within reach, bed locked and in lowest position. Non-skid socks on while out of bed. Patient instructed to call for assistance. Skin integrity maintained as patient is independent with frequent repositioning. C/o pain, managed with PRN meds, no other complaints or concerns. Progressing towards goals. Will continue to monitor and follow plan of care.

## 2022-06-27 NOTE — TELEPHONE ENCOUNTER
Attempted to reach pt. No VM box set up at this time.     LVM for pts point of contact. Informed her that we are reaching out to assist with getting Mr. Caal scheduled. Stated that we understand he may be a bit overwhelmed at the moment trying to get discharged, but that we do have availability for him to be seen at [facility] 06/28/22. Requested a call back to the [facility] at [phone] for assistance scheduling.

## 2022-06-27 NOTE — HPI
Thomas Caal is a 42 y.o. left hand dominant healthy male transferred [place] [facility] [place] for evaluation of a right wrist injury.  The patient reports he was working at a job site the evening of presentation when the a disc on a  broke and 1 of the fragments flew off and hit his wrist and face.  He had immediate pain and noticed wounds to his right wrist and left eyebrow.  He presented to an outside emergency department for evaluation.  X-rays there showed a displaced fracture right distal radius.  Exam there showed a wound over the radial aspect of the forearm and no pulsatile bleeding.  He received Ancef and a tetanus booster at the outside ED.  The patient was transferred to the [facility] [place] for evaluation by Orthopedic surgery.  The patient denies numbness, tingling, and weakness in his right upper extremity.  Other than his eyebrow laceration, he denies other injuries.    Orthopedics is consulted for evaluation of the right open distal radius fracture.

## 2022-06-27 NOTE — PLAN OF CARE
[facility] - Surgery  Initial Discharge Assessment       Primary Care Provider: Primary Doctor No    Admission Diagnosis: Closed displaced spiral fracture of shaft of left femur [S72.342A]  Type I or II open fracture of distal end of right radius, unspecified fracture morphology, initial encounter [S52.501B]    Admission Date: 6/26/2022  Expected Discharge Date: 6/28/2022    Discharge Barriers Identified: None    Payor: MEDICAID / [facility]: [facility] / Product Type: Managed Medicaid /     Extended Emergency Contact Information  Primary Emergency Contact: Luann Benedict  Mobile Phone: [phone]  Relation: Significant other  Preferred language: English    Discharge Plan A: Home with family  Discharge Plan B: Home with family      [id number] [place] [address] AT [facility] [address]  [address]  [place] [address]  Phone: [phone] Fax: [phone]      Initial Assessment (most recent)     Adult Discharge Assessment - 06/27/22 1031        Discharge Assessment    Assessment Type Discharge Planning Assessment     Confirmed/corrected address, phone number and insurance Yes     Confirmed Demographics Correct on Facesheet     Source of Information patient     Communicated DAVID with patient/caregiver Yes     Lives With child(munir), dependent;significant other     Do you expect to return to your current living situation? Yes     Do you have help at home or someone to help you manage your care at home? Yes     Who are your caregiver(s) and their phone number(s)? Luann Benedict     Prior to hospitilization cognitive status: Alert/Oriented     Current cognitive status: Alert/Oriented     Home Layout Able to live on 1st floor     Equipment Currently Used at Home none     Patient currently being followed by outpatient case management? No     Do you currently have service(s) that help you manage your care at home? No     Do you take prescription  medications? No     How do you get to doctors appointments? car, drives self     Are you on dialysis? No     Do you take coumadin? No     Discharge Plan A Home with family     Discharge Plan B Home with family     DME Needed Upon Discharge  none     Discharge Plan discussed with: Patient     Discharge Barriers Identified None               Patient lives with his significant other Luann Benedict and five children ages 16, 13, 12, 11, and 9 years old in a single story home with one entry step. Ms Benedict will help/assist patient with care upon hospital discharge. Patient does not feel he will need any post acute services upon hospital discharge. Ms. Benedict will provide transportation home.

## 2022-06-27 NOTE — ED PROVIDER NOTES
Encounter Date: 6/26/2022       History     Chief Complaint   Patient presents with    Transfer     [facility] for ortho; distal radius fx     Mr. Caal is a 42-year-old male with no significant past medical history who was transferred to [facility] [place] from [facility] for orthopedic surgery evaluation after patient was found to have a Comminuted fracture of the distal radius along the radial side.  Patient had presented to [facility] due to right wrist pain as well as a laceration on his right wrist.  Patient states that he was using a  when the blade struck him in the left eyebrow and the right wrist approximately 20 minutes prior to arrival. He denies any vision change, eye pain or FB sensation to the eye. He had pain to the distal R forearm but no other complaints.         Review of patient's allergies indicates:  No Known Allergies  No past medical history on file.  No past surgical history on file.  No family history on file.  Social History     Tobacco Use    Smoking status: Current Every Day Smoker     Packs/day: 1.00    Smokeless tobacco: Never Used   Substance Use Topics    Alcohol use: Never    Drug use: Yes     Types: Marijuana     Review of Systems   Constitutional: Negative for activity change, appetite change, chills, fatigue and fever.   HENT: Negative for congestion, facial swelling, hearing loss, mouth sores, rhinorrhea, sinus pain, sore throat and trouble swallowing.    Eyes: Negative for photophobia and pain.   Respiratory: Negative for choking, chest tightness, shortness of breath and wheezing.    Cardiovascular: Negative for chest pain and palpitations.   Gastrointestinal: Negative for abdominal pain, constipation, diarrhea, nausea and vomiting.   Endocrine: Negative for polydipsia and polyuria.   Genitourinary: Negative for difficulty urinating, dysuria, flank pain, hematuria, penile pain and testicular pain.   Musculoskeletal: Positive for joint swelling. Negative for back  pain, myalgias and neck stiffness.   Skin: Positive for wound. Negative for color change and pallor.   Neurological: Negative for dizziness, seizures, weakness, light-headedness, numbness and headaches.   Psychiatric/Behavioral: Negative for agitation and confusion.       Physical Exam     Initial Vitals   BP Pulse Resp Temp SpO2   06/26/22 2050 06/26/22 2050 06/26/22 2050 06/26/22 2051 06/26/22 2050   119/67 (!) 54 18 98.6 °F (37 °C) 99 %      MAP       --                Physical Exam    Nursing note and vitals reviewed.  Constitutional: He appears well-developed and well-nourished. He is not diaphoretic. No distress.   HENT:   Head: Normocephalic and atraumatic.   Mouth/Throat: Oropharynx is clear and moist.   2-cm laceration to left eyebrow  No eye or facial involvement  No facial tenderness   Eyes: Conjunctivae and EOM are normal. Pupils are equal, round, and reactive to light.   Neck: No thyromegaly present. No JVD present.   Normal range of motion.  Cardiovascular: Normal rate, normal heart sounds and intact distal pulses. Exam reveals no gallop.    No murmur heard.  Pulmonary/Chest: Breath sounds normal. No respiratory distress. He has no wheezes. He has no rales. He exhibits no tenderness.   Abdominal: Abdomen is soft. Bowel sounds are normal. He exhibits no distension. There is no abdominal tenderness. There is no guarding.   Musculoskeletal:         General: Tenderness present. Normal range of motion.      Cervical back: Normal range of motion.      Comments: Tenderness along right wrist  3cm laceration on radial side of right wrist     Lymphadenopathy:     He has no cervical adenopathy.   Neurological: He is alert and oriented to person, place, and time. He has normal strength.   RUE is neurovascularly intact   Skin: Skin is warm. Capillary refill takes less than 2 seconds.         ED Course   Procedures  Labs Reviewed - No data to display       Imaging Results    None          Medications   LIDOcaine  (PF) 10 mg/ml (1%) injection 100 mg (100 mg Other Not Given 6/26/22 2200)   sodium chloride 0.9% flush 10 mL (has no administration in time range)   ondansetron disintegrating tablet 8 mg (has no administration in time range)   melatonin tablet 6 mg (has no administration in time range)   acetaminophen tablet 650 mg (has no administration in time range)   acetaminophen tablet 650 mg (has no administration in time range)   oxyCODONE immediate release tablet 5 mg (has no administration in time range)   oxyCODONE immediate release tablet Tab 10 mg (10 mg Oral Given 6/27/22 0331)   morphine injection 4 mg (has no administration in time range)   ceFAZolin 2 gram in dextrose 5% 100 mL IVPB (premix) (2 g Intravenous New Bag 6/27/22 0232)   methocarbamoL tablet 500 mg (has no administration in time range)   fentaNYL 50 mcg/mL injection 25 mcg (25 mcg Intravenous Given 6/26/22 2139)   LIDOcaine (PF) 10 mg/ml (1%) injection 10 mg (10 mg Other Given by Provider 6/27/22 0000)     Medical Decision Making:   Initial Assessment:   Mr. Caal is a 42-year-old male with no significant past medical history who was transferred [place] [facility] [place] from [facility] for orthopedic surgery evaluation after patient was found to have a Comminuted fracture of the distal radius along the radial side.    Differential Diagnosis:   Distal radial fracture  Open fracture  Ulnar fracture  Foreign body in wound  Clinical Tests:   Lab Tests: Ordered and Reviewed  ED Management:  Patient was thoroughly examined,  Physical exam was significant for a laceration to his right wrist as well as obvious deformity of the wrist. Outside imaging was reviewed  Discussion was had with orthopedic surgery who stated that they will examine the patient and admit him to their service.  Plan is for OR intervention tomorrow.             Attending Attestation:   Physician Attestation Statement for Resident:  As the supervising MD   Physician Attestation Statement: I have  personally seen and examined this patient.   I agree with the above history. -:   As the supervising MD I agree with the above PE.    As the supervising MD I agree with the above treatment, course, plan, and disposition.                ED Course as of 06/27/22 0417   Sun Jun 26, 2022 2127 Discussion had with orthopedic surgery.  Plan is for orthopedic surgery to admit the patient for OR [OO]      ED Course User Index  [OO] Susan Novak MD             Clinical Impression:   Final diagnoses:  [S52.501B] Type I or II open fracture of distal end of right radius, unspecified fracture morphology, initial encounter (Primary)          ED Disposition Condition    Observation               Susan Novak MD  Resident  06/26/22 2332       Susan Novak MD  Resident  06/26/22 2333       Molly Hamilton MD  06/27/22 3914

## 2022-06-28 ENCOUNTER — OFFICE VISIT (OUTPATIENT)
Dept: ORTHOPEDICS | Facility: CLINIC | Age: 42
End: 2022-06-28
Payer: MEDICAID

## 2022-06-28 ENCOUNTER — TELEPHONE (OUTPATIENT)
Dept: ORTHOPEDICS | Facility: CLINIC | Age: 42
End: 2022-06-28
Payer: MEDICAID

## 2022-06-28 ENCOUNTER — DOCUMENTATION ONLY (OUTPATIENT)
Dept: ORTHOPEDICS | Facility: CLINIC | Age: 42
End: 2022-06-28

## 2022-06-28 VITALS
HEART RATE: 58 BPM | HEIGHT: 73 IN | WEIGHT: 174.19 LBS | SYSTOLIC BLOOD PRESSURE: 128 MMHG | BODY MASS INDEX: 23.09 KG/M2 | DIASTOLIC BLOOD PRESSURE: 88 MMHG

## 2022-06-28 DIAGNOSIS — S69.92XA WRIST INJURY, LEFT, INITIAL ENCOUNTER: Primary | ICD-10-CM

## 2022-06-28 PROCEDURE — 99204 OFFICE O/P NEW MOD 45 MIN: CPT | Mod: S$PBB,,, | Performed by: ORTHOPAEDIC SURGERY

## 2022-06-28 PROCEDURE — 1159F PR MEDICATION LIST DOCUMENTED IN MEDICAL RECORD: ICD-10-PCS | Mod: CPTII,,, | Performed by: ORTHOPAEDIC SURGERY

## 2022-06-28 PROCEDURE — 3079F DIAST BP 80-89 MM HG: CPT | Mod: CPTII,,, | Performed by: ORTHOPAEDIC SURGERY

## 2022-06-28 PROCEDURE — 3079F PR MOST RECENT DIASTOLIC BLOOD PRESSURE 80-89 MM HG: ICD-10-PCS | Mod: CPTII,,, | Performed by: ORTHOPAEDIC SURGERY

## 2022-06-28 PROCEDURE — 99213 OFFICE O/P EST LOW 20 MIN: CPT | Mod: PBBFAC | Performed by: ORTHOPAEDIC SURGERY

## 2022-06-28 PROCEDURE — 1159F MED LIST DOCD IN RCRD: CPT | Mod: CPTII,,, | Performed by: ORTHOPAEDIC SURGERY

## 2022-06-28 PROCEDURE — 99999 PR PBB SHADOW E&M-EST. PATIENT-LVL III: ICD-10-PCS | Mod: PBBFAC,,, | Performed by: ORTHOPAEDIC SURGERY

## 2022-06-28 PROCEDURE — 99999 PR PBB SHADOW E&M-EST. PATIENT-LVL III: CPT | Mod: PBBFAC,,, | Performed by: ORTHOPAEDIC SURGERY

## 2022-06-28 PROCEDURE — 3074F PR MOST RECENT SYSTOLIC BLOOD PRESSURE < 130 MM HG: ICD-10-PCS | Mod: CPTII,,, | Performed by: ORTHOPAEDIC SURGERY

## 2022-06-28 PROCEDURE — 3074F SYST BP LT 130 MM HG: CPT | Mod: CPTII,,, | Performed by: ORTHOPAEDIC SURGERY

## 2022-06-28 PROCEDURE — 99204 PR OFFICE/OUTPT VISIT, NEW, LEVL IV, 45-59 MIN: ICD-10-PCS | Mod: S$PBB,,, | Performed by: ORTHOPAEDIC SURGERY

## 2022-06-28 PROCEDURE — 3008F BODY MASS INDEX DOCD: CPT | Mod: CPTII,,, | Performed by: ORTHOPAEDIC SURGERY

## 2022-06-28 PROCEDURE — 3008F PR BODY MASS INDEX (BMI) DOCUMENTED: ICD-10-PCS | Mod: CPTII,,, | Performed by: ORTHOPAEDIC SURGERY

## 2022-06-28 NOTE — DISCHARGE SUMMARY
[facility] - Orthopedic Surgery  Short Stay  Discharge Summary    Admit Date: 6/26/2022    Discharge Date and Time: 6/27/2022 11:29 PM      Discharge Attending Physician: Jamilah att. providers found     Hospital Course (synopsis of major diagnoses, care, treatment, and services provided during the course of the hospital stay): [place] [facility] [place] after sustaining a right wrist injury from a  failure. Found to have an open fracture which was treated with a bedside washout at the outside ED and in the [facility]. He his laceration was closed and he was admitted for IV antibiotics. After 24 hours of IV antibiotics after wound closure, he was discharged with PO antibiotics and pain medication with plans to follow up in [facility] as soon as possible for further evaluation and surgery discussion. Patient voiced understanding and elected to be discharged at that time. Return precautions given.    Final Diagnoses:    Principal Problem: Open fracture of distal end of right radius    Discharged Condition: stable    Disposition: Home or Self Care    Medications:  Reconciled Home Medications:      Medication List      START taking these medications    HYDROcodone-acetaminophen 5-325 mg per tablet  Commonly known as: NORCO  Take 1 tablet by mouth every 6 (six) hours as needed for Pain.     sulfamethoxazole-trimethoprim 800-160mg 800-160 mg Tab  Commonly known as: BACTRIM DS  Take 1 tablet by mouth 2 (two) times daily. for 7 days          Discharge Procedure Orders   Diet general     Call MD for:  temperature >100.4     Call MD for:  persistent nausea and vomiting     Call MD for:  severe uncontrolled pain     Call MD for:  difficulty breathing, headache or visual disturbances     Call MD for:  redness, tenderness, or signs of infection (pain, swelling, redness, odor or green/yellow discharge around incision site)     Call MD for:  hives     Call MD for:  persistent dizziness or light-headedness     Call MD for:   extreme fatigue     Leave dressing on - Keep it clean, dry, and intact until clinic visit     Non weight bearing

## 2022-06-28 NOTE — TELEPHONE ENCOUNTER
Spoke c pts sig other. Spoke c pt. Offered and Confirmed same day appt location & time c Dr. Ponce 06/29/22. Pt expressed understanding & was thankful.

## 2022-06-28 NOTE — TELEPHONE ENCOUNTER
LEXIEM for pts care giver. Requested a call back to the [facility] at [phone] for assistance scheduling with Dr. Ponce today.       ----- Message from Poppy Hua sent at 6/28/2022 10:12 AM CDT -----  Type:  Patient Returning Call    Who Called: Luann Bradshaw/ regina     Who Left Message for Patient: Rutwhitney Salomon    Does the patient know what this is regarding?: yes     Would the patient rather a call back or a response via [facility]? Call back     Best Call Back Number: [phone]

## 2022-06-28 NOTE — NURSING
Pt has prescription meds at bedside.  Went over discharge instructions- report to MD- redness to incision, SOB, Chest pain, temp greater than 100.4, severe pain.  Instr pt that [facility] has an on call nurse 24/7 that you can call for any questions- phone number is in packet.  Instr pt re follow up appt.  Pt verbalized understanding.

## 2022-06-29 DIAGNOSIS — S52.501B TYPE I OR II OPEN FRACTURE OF DISTAL END OF RIGHT RADIUS, UNSPECIFIED FRACTURE MORPHOLOGY, INITIAL ENCOUNTER: Primary | ICD-10-CM

## 2022-06-29 NOTE — H&P (VIEW-ONLY)
Orthopedics    SUBJECTIVE:        Chief Complaint:     Referring Provider: Nii Cerrato*     History of Present Illness:  Thomas Caal is a 42 y.o. left hand dominant healthy male who presents with right wrist injury. He presented to [facility] ED two days prior for evaluation of a right wrist injury. The patient reports he was working at a job site the evening of presentation when the a disc on a  broke and 1 of the fragments flew off and hit his wrist and face.  He had immediate pain and noticed wounds to his right wrist and left eyebrow.  He presented to an outside emergency department for evaluation.  X-rays there showed a displaced fracture right distal radius.  Exam there showed a wound over the radial aspect of the forearm and no pulsatile bleeding.  He received Ancef and a tetanus booster at the outside ED.  The patient was transferred to the [facility] for evaluation by Orthopedic surgery team where there was extensive irrigation and debridement and he was discharged with po antibiotics.    Patient reports compliance with po antibiotics. He reports no numbness or tingling today. He has continued pain in the right wrist.             History reviewed. No pertinent past medical history.  History reviewed. No pertinent surgical history.  Review of patient's allergies indicates:  No Known Allergies  Social History     Social History Narrative    Not on file     History reviewed. No pertinent family history.      Current Outpatient Medications:     HYDROcodone-acetaminophen (NORCO) 5-325 mg per tablet, Take 1 tablet by mouth every 6 (six) hours as needed for Pain., Disp: 12 tablet, Rfl: 0    sulfamethoxazole-trimethoprim 800-160mg (BACTRIM DS) 800-160 mg Tab, Take 1 tablet by mouth 2 (two) times daily. for 7 days, Disp: 14 tablet, Rfl: 0      Review of Systems:  Review of Systems   Constitution: Negative for chills and fever.   HENT: Negative for ear discharge and ear pain.    Eyes: Negative for double  "vision and pain.   Cardiovascular: Negative for cyanosis and dyspnea on exertion.   Respiratory: Negative for cough and shortness of breath.    Endocrine: Negative for cold intolerance and heat intolerance.   Skin: Negative for itching and rash.   Musculoskeletal:        See HPI   Gastrointestinal: Negative for constipation and diarrhea.   Genitourinary: Negative for flank pain and frequency.   Neurological: Negative for excessive daytime sleepiness and focal weakness.     OBJECTIVE:      Vital Signs (Most Recent):  Vitals:    06/28/22 1543   BP: 128/88   Pulse: (!) 58   Weight: 79 kg (174 lb 2.6 oz)   Height: 6' 1" (1.854 m)     Body mass index is 22.98 kg/m².      Physical Exam:  Constitutional: The patient appears well-developed and well-nourished. No distress.   Skin: No lesions appreciated  Head: Normocephalic and atraumatic.   Nose: Nose normal.   Ears: No deformities seen  Eyes: Conjunctivae and EOM are normal.   Neck: No tracheal deviation present.   Cardiovascular: Normal rate and intact distal pulses.    Pulmonary/Chest: Effort normal. No respiratory distress.   Abdominal: There is no guarding.   Neurological: The patient is alert.   Psychiatric: The patient has a normal mood and affect.     RUE: No gross deformity noted. 3 cm vertical laceration located over the radial aspect of the wrist. Sutures in place in laceration. TTP right wrist. FROM shoulder, elbow. EPL assessed and is intact on exam. SILT M/U/R. Motor intact AIN/PIN/M/U/R. Cap refill < 2s. 2+ RP      Diagnostic Results:     Imaging - I independently viewed the patient's imaging as well as the radiology report.  Xrays and CT of the patient's right wrist demonstrates an intra articular fracture of the distal radius involving the radial styloid which is displace. No dislocations or significant degenerative changes.    EMG - none    ASSESSMENT/PLAN:      42 y.o. yo male with right displaced distal radius radial styloid fracture    Plan for ORIF " right distal radius. Consents signed in clinic.     The risks, benefits and alternatives to surgery were discussed with the patient at great length.  These include pain, bleeding, infection, vessel/nerve damage, numbness, tingling, complex regional pain syndrome, recurrent infection need for further surgery, scarring, malunion, nonunion,hardware failure, hardware breakage, iatrogenic fracture, periprosthetic fracture, wound healing complications requiring further procedure for soft tissue coverage including flap coverage, cartilage damage,  DVT, PE, arthritis and death.  Patient states an understanding and wishes to proceed with surgery.   All questions were answered.  No guarantees were implied or stated.  Informed consent was obtained.

## 2022-06-29 NOTE — PROGRESS NOTES
Orthopedics    SUBJECTIVE:        Chief Complaint:     Referring Provider: Nii Cerrato*     History of Present Illness:  Thomas Caal is a 42 y.o. left hand dominant healthy male who presents with right wrist injury. He [place] [facility] [place] prior for evaluation of a right wrist injury. The patient reports he was working at a job site the evening of presentation when the a disc on a  broke and 1 of the fragments flew off and hit his wrist and face.  He had immediate pain and noticed wounds to his right wrist and left eyebrow.  He presented to an outside emergency department for evaluation.  X-rays there showed a displaced fracture right distal radius.  Exam there showed a wound over the radial aspect of the forearm and no pulsatile bleeding.  He received Ancef and a tetanus booster at the outside ED.  The patient was transferred to the [facility] [place] for evaluation by Orthopedic surgery team where there was extensive irrigation and debridement and he was discharged with po antibiotics.    Patient reports compliance with po antibiotics. He reports no numbness or tingling today. He has continued pain in the right wrist.             History reviewed. No pertinent past medical history.  History reviewed. No pertinent surgical history.  Review of patient's allergies indicates:  No Known Allergies  Social History     Social History Narrative    Not on file     History reviewed. No pertinent family history.      Current Outpatient Medications:     HYDROcodone-acetaminophen (NORCO) 5-325 mg per tablet, Take 1 tablet by mouth every 6 (six) hours as needed for Pain., Disp: 12 tablet, Rfl: 0    sulfamethoxazole-trimethoprim 800-160mg (BACTRIM DS) 800-160 mg Tab, Take 1 tablet by mouth 2 (two) times daily. for 7 days, Disp: 14 tablet, Rfl: 0      Review of Systems:  Review of Systems   Constitution: Negative for chills and fever.   HENT: Negative for ear discharge and ear pain.    Eyes: Negative for double  "vision and pain.   Cardiovascular: Negative for cyanosis and dyspnea on exertion.   Respiratory: Negative for cough and shortness of breath.    Endocrine: Negative for cold intolerance and heat intolerance.   Skin: Negative for itching and rash.   Musculoskeletal:        See HPI   Gastrointestinal: Negative for constipation and diarrhea.   Genitourinary: Negative for flank pain and frequency.   Neurological: Negative for excessive daytime sleepiness and focal weakness.     OBJECTIVE:      Vital Signs (Most Recent):  Vitals:    06/28/22 1543   BP: 128/88   Pulse: (!) 58   Weight: 79 kg (174 lb 2.6 oz)   Height: 6' 1" (1.854 m)     Body mass index is 22.98 kg/m².      Physical Exam:  Constitutional: The patient appears well-developed and well-nourished. No distress.   Skin: No lesions appreciated  Head: Normocephalic and atraumatic.   Nose: Nose normal.   Ears: No deformities seen  Eyes: Conjunctivae and EOM are normal.   Neck: No tracheal deviation present.   Cardiovascular: Normal rate and intact distal pulses.    Pulmonary/Chest: Effort normal. No respiratory distress.   Abdominal: There is no guarding.   Neurological: The patient is alert.   Psychiatric: The patient has a normal mood and affect.     RUE: No gross deformity noted. 3 cm vertical laceration located over the radial aspect of the wrist. Sutures in place in laceration. TTP right wrist. FROM shoulder, elbow. EPL assessed and is intact on exam. SILT M/U/R. Motor intact AIN/PIN/M/U/R. Cap refill < 2s. 2+ RP      Diagnostic Results:     Imaging - I independently viewed the patient's imaging as well as the radiology report.  Xrays and CT of the patient's right wrist demonstrates an intra articular fracture of the distal radius involving the radial styloid which is displace. No dislocations or significant degenerative changes.    EMG - none    ASSESSMENT/PLAN:      42 y.o. yo male with right displaced distal radius radial styloid fracture    Plan for ORIF " right distal radius. Consents signed in clinic.     The risks, benefits and alternatives to surgery were discussed with the patient at great length.  These include pain, bleeding, infection, vessel/nerve damage, numbness, tingling, complex regional pain syndrome, recurrent infection need for further surgery, scarring, malunion, nonunion,hardware failure, hardware breakage, iatrogenic fracture, periprosthetic fracture, wound healing complications requiring further procedure for soft tissue coverage including flap coverage, cartilage damage,  DVT, PE, arthritis and death.  Patient states an understanding and wishes to proceed with surgery.   All questions were answered.  No guarantees were implied or stated.  Informed consent was obtained.

## 2022-06-29 NOTE — PROGRESS NOTES
I have personally taken the history and examined this patient. I agree with the resident's note as stated above.     Plan for ORIF right distal radius. Consents signed in clinic.      The risks, benefits and alternatives to surgery were discussed with the patient at great length.  These include pain, bleeding, infection, vessel/nerve damage, numbness, tingling, complex regional pain syndrome, recurrent infection need for further surgery, scarring, malunion, nonunion,hardware failure, hardware breakage, iatrogenic fracture, periprosthetic fracture, wound healing complications requiring further procedure for soft tissue coverage including flap coverage, cartilage damage,  DVT, PE, arthritis and death.  Patient states an understanding and wishes to proceed with surgery.   All questions were answered.  No guarantees were implied or stated.  Informed consent was obtained.   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

## 2022-06-29 NOTE — PLAN OF CARE
[facility]  Discharge Final Note    Primary Care Provider: Primary Doctor No    Expected Discharge Date: 6/27/2022    Final Discharge Note (most recent)     Final Note - 06/27/22 1129        Final Note    Assessment Type Final Discharge Note     Anticipated Discharge Disposition Home or Self Care     What phone number can be called within the next 1-3 days to see how you are doing after discharge? [phone]     Hospital Resources/Appts/Education Provided Provided patient/caregiver with written discharge plan information;Appointments scheduled by Navigator/Coordinator

## 2022-06-30 ENCOUNTER — ANESTHESIA EVENT (OUTPATIENT)
Dept: SURGERY | Facility: HOSPITAL | Age: 42
End: 2022-06-30
Payer: MEDICAID

## 2022-06-30 ENCOUNTER — TELEPHONE (OUTPATIENT)
Dept: ORTHOPEDICS | Facility: CLINIC | Age: 42
End: 2022-06-30
Payer: MEDICAID

## 2022-06-30 RX ORDER — OXYCODONE AND ACETAMINOPHEN 5; 325 MG/1; MG/1
1 TABLET ORAL
Qty: 10 TABLET | Refills: 0 | Status: SHIPPED | OUTPATIENT
Start: 2022-06-30

## 2022-06-30 RX ORDER — IBUPROFEN 600 MG/1
600 TABLET ORAL 3 TIMES DAILY
Qty: 42 TABLET | Refills: 0 | Status: SHIPPED | OUTPATIENT
Start: 2022-06-30

## 2022-06-30 RX ORDER — ACETAMINOPHEN 500 MG
1000 TABLET ORAL EVERY 12 HOURS PRN
Qty: 56 TABLET | Refills: 0 | Status: SHIPPED | OUTPATIENT
Start: 2022-06-30

## 2022-06-30 NOTE — TELEPHONE ENCOUNTER
Spoke c pt. Informed pt of 7 AM arrival time for 07/01/22 surgery at the [facility]. Reminded pt of NPO status and Post Op appt. Pt expressed understanding & was thankful.

## 2022-07-01 ENCOUNTER — ANESTHESIA (OUTPATIENT)
Dept: SURGERY | Facility: HOSPITAL | Age: 42
End: 2022-07-01
Payer: MEDICAID

## 2022-07-01 ENCOUNTER — HOSPITAL ENCOUNTER (OUTPATIENT)
Facility: HOSPITAL | Age: 42
Discharge: HOME | End: 2022-07-01
Attending: ORTHOPAEDIC SURGERY | Admitting: ORTHOPAEDIC SURGERY
Payer: MEDICAID

## 2022-07-01 VITALS
HEIGHT: 73 IN | TEMPERATURE: 98 F | RESPIRATION RATE: 13 BRPM | OXYGEN SATURATION: 100 % | HEART RATE: 39 BPM | SYSTOLIC BLOOD PRESSURE: 117 MMHG | WEIGHT: 175 LBS | BODY MASS INDEX: 23.19 KG/M2 | DIASTOLIC BLOOD PRESSURE: 70 MMHG

## 2022-07-01 DIAGNOSIS — S52.501B TYPE I OR II OPEN FRACTURE OF DISTAL END OF RIGHT RADIUS, UNSPECIFIED FRACTURE MORPHOLOGY, INITIAL ENCOUNTER: ICD-10-CM

## 2022-07-01 LAB
CTP QC/QA: YES
SARS-COV-2 AG RESP QL IA.RAPID: NEGATIVE

## 2022-07-01 PROCEDURE — 64415 NJX AA&/STRD BRCH PLXS IMG: CPT | Mod: 59,RT,, | Performed by: ANESTHESIOLOGY

## 2022-07-01 PROCEDURE — 25000003 PHARM REV CODE 250: Performed by: ANESTHESIOLOGY

## 2022-07-01 PROCEDURE — 36000711: Performed by: ORTHOPAEDIC SURGERY

## 2022-07-01 PROCEDURE — D9220A PRA ANESTHESIA: Mod: CRNA,,, | Performed by: NURSE ANESTHETIST, CERTIFIED REGISTERED

## 2022-07-01 PROCEDURE — D9220A PRA ANESTHESIA: ICD-10-PCS | Mod: ANES,,, | Performed by: ANESTHESIOLOGY

## 2022-07-01 PROCEDURE — 25608 OPTX DST RD XART FX/EPI SEP2: CPT | Mod: RT,,, | Performed by: ORTHOPAEDIC SURGERY

## 2022-07-01 PROCEDURE — D9220A PRA ANESTHESIA: ICD-10-PCS | Mod: CRNA,,, | Performed by: NURSE ANESTHETIST, CERTIFIED REGISTERED

## 2022-07-01 PROCEDURE — 27200750 HC INSULATED NEEDLE/ STIMUPLEX: Performed by: ANESTHESIOLOGY

## 2022-07-01 PROCEDURE — 01830 ANES ARTHR/NDSC WRST/HND NOS: CPT | Performed by: ORTHOPAEDIC SURGERY

## 2022-07-01 PROCEDURE — 27201423 OPTIME MED/SURG SUP & DEVICES STERILE SUPPLY: Performed by: ORTHOPAEDIC SURGERY

## 2022-07-01 PROCEDURE — 63600175 PHARM REV CODE 636 W HCPCS: Performed by: STUDENT IN AN ORGANIZED HEALTH CARE EDUCATION/TRAINING PROGRAM

## 2022-07-01 PROCEDURE — 63600175 PHARM REV CODE 636 W HCPCS: Performed by: NURSE ANESTHETIST, CERTIFIED REGISTERED

## 2022-07-01 PROCEDURE — 64415 PR NERVE BLOCK INJ, ANES/STEROID, BRACHIAL PLEXUS, INCL IMAG GUIDANCE: ICD-10-PCS | Mod: 59,RT,, | Performed by: ANESTHESIOLOGY

## 2022-07-01 PROCEDURE — 25000003 PHARM REV CODE 250: Performed by: NURSE ANESTHETIST, CERTIFIED REGISTERED

## 2022-07-01 PROCEDURE — C1769 GUIDE WIRE: HCPCS | Performed by: ORTHOPAEDIC SURGERY

## 2022-07-01 PROCEDURE — 71000015 HC POSTOP RECOV 1ST HR: Performed by: ORTHOPAEDIC SURGERY

## 2022-07-01 PROCEDURE — 63600175 PHARM REV CODE 636 W HCPCS: Performed by: ANESTHESIOLOGY

## 2022-07-01 PROCEDURE — 99900035 HC TECH TIME PER 15 MIN (STAT)

## 2022-07-01 PROCEDURE — 76942 ECHO GUIDE FOR BIOPSY: CPT | Mod: 26,,, | Performed by: ANESTHESIOLOGY

## 2022-07-01 PROCEDURE — C1713 ANCHOR/SCREW BN/BN,TIS/BN: HCPCS | Performed by: ORTHOPAEDIC SURGERY

## 2022-07-01 PROCEDURE — 25608 PR OPEN RX DISTAL RADIUS FX, INTRA-ARTICULAR, 2 FRAG: ICD-10-PCS | Mod: RT,,, | Performed by: ORTHOPAEDIC SURGERY

## 2022-07-01 PROCEDURE — 37000009 HC ANESTHESIA EA ADD 15 MINS: Performed by: ORTHOPAEDIC SURGERY

## 2022-07-01 PROCEDURE — 94761 N-INVAS EAR/PLS OXIMETRY MLT: CPT

## 2022-07-01 PROCEDURE — 76942 ECHO GUIDE FOR BIOPSY: CPT | Performed by: ANESTHESIOLOGY

## 2022-07-01 PROCEDURE — 36000710: Performed by: ORTHOPAEDIC SURGERY

## 2022-07-01 PROCEDURE — D9220A PRA ANESTHESIA: Mod: ANES,,, | Performed by: ANESTHESIOLOGY

## 2022-07-01 PROCEDURE — 37000008 HC ANESTHESIA 1ST 15 MINUTES: Performed by: ORTHOPAEDIC SURGERY

## 2022-07-01 PROCEDURE — 25000003 PHARM REV CODE 250: Performed by: ORTHOPAEDIC SURGERY

## 2022-07-01 PROCEDURE — 76942 PR U/S GUIDANCE FOR NEEDLE GUIDANCE: ICD-10-PCS | Mod: 26,,, | Performed by: ANESTHESIOLOGY

## 2022-07-01 PROCEDURE — 71000033 HC RECOVERY, INTIAL HOUR: Performed by: ORTHOPAEDIC SURGERY

## 2022-07-01 DEVICE — PLATE BONE DST VOLAR RAD STND: Type: IMPLANTABLE DEVICE | Site: ARM | Status: FUNCTIONAL

## 2022-07-01 DEVICE — SCREW BONE NL HEXALOBE 3.5 X 1: Type: IMPLANTABLE DEVICE | Site: ARM | Status: FUNCTIONAL

## 2022-07-01 DEVICE — SCREW BONE LOK CONG 2.3X20MM: Type: IMPLANTABLE DEVICE | Site: ARM | Status: FUNCTIONAL

## 2022-07-01 DEVICE — SCREW BONE NLHEXALOBE 3.5 X 18: Type: IMPLANTABLE DEVICE | Site: ARM | Status: FUNCTIONAL

## 2022-07-01 DEVICE — IMPLANTABLE DEVICE: Type: IMPLANTABLE DEVICE | Site: ARM | Status: FUNCTIONAL

## 2022-07-01 RX ORDER — OXYCODONE HYDROCHLORIDE 5 MG/1
5 TABLET ORAL
Status: DISCONTINUED | OUTPATIENT
Start: 2022-07-01 | End: 2022-07-01 | Stop reason: HOSPADM

## 2022-07-01 RX ORDER — ACETAMINOPHEN 500 MG
1000 TABLET ORAL ONCE
Status: DISCONTINUED | OUTPATIENT
Start: 2022-07-01 | End: 2022-07-01 | Stop reason: HOSPADM

## 2022-07-01 RX ORDER — CEFAZOLIN SODIUM 1 G/3ML
2 INJECTION, POWDER, FOR SOLUTION INTRAMUSCULAR; INTRAVENOUS
Status: COMPLETED | OUTPATIENT
Start: 2022-07-01 | End: 2022-07-01

## 2022-07-01 RX ORDER — FENTANYL CITRATE 50 UG/ML
25 INJECTION, SOLUTION INTRAMUSCULAR; INTRAVENOUS EVERY 5 MIN PRN
Status: DISCONTINUED | OUTPATIENT
Start: 2022-07-01 | End: 2022-07-01 | Stop reason: HOSPADM

## 2022-07-01 RX ORDER — ONDANSETRON 2 MG/ML
INJECTION INTRAMUSCULAR; INTRAVENOUS
Status: DISCONTINUED | OUTPATIENT
Start: 2022-07-01 | End: 2022-07-01

## 2022-07-01 RX ORDER — MIDAZOLAM HYDROCHLORIDE 1 MG/ML
2 INJECTION INTRAMUSCULAR; INTRAVENOUS
Status: DISCONTINUED | OUTPATIENT
Start: 2022-07-01 | End: 2022-07-01 | Stop reason: HOSPADM

## 2022-07-01 RX ORDER — SODIUM CHLORIDE 0.9 % (FLUSH) 0.9 %
3 SYRINGE (ML) INJECTION EVERY 6 HOURS
Status: DISCONTINUED | OUTPATIENT
Start: 2022-07-01 | End: 2022-07-01 | Stop reason: HOSPADM

## 2022-07-01 RX ORDER — ACETAMINOPHEN 500 MG
1000 TABLET ORAL
Status: COMPLETED | OUTPATIENT
Start: 2022-07-01 | End: 2022-07-01

## 2022-07-01 RX ORDER — BUPIVACAINE HYDROCHLORIDE AND EPINEPHRINE 5; 5 MG/ML; UG/ML
INJECTION, SOLUTION EPIDURAL; INTRACAUDAL; PERINEURAL
Status: COMPLETED | OUTPATIENT
Start: 2022-07-01 | End: 2022-07-01

## 2022-07-01 RX ORDER — DEXAMETHASONE SODIUM PHOSPHATE 4 MG/ML
INJECTION, SOLUTION INTRA-ARTICULAR; INTRALESIONAL; INTRAMUSCULAR; INTRAVENOUS; SOFT TISSUE
Status: DISCONTINUED | OUTPATIENT
Start: 2022-07-01 | End: 2022-07-01

## 2022-07-01 RX ORDER — HALOPERIDOL 5 MG/ML
0.5 INJECTION INTRAMUSCULAR EVERY 10 MIN PRN
Status: DISCONTINUED | OUTPATIENT
Start: 2022-07-01 | End: 2022-07-01 | Stop reason: HOSPADM

## 2022-07-01 RX ORDER — PROPOFOL 10 MG/ML
VIAL (ML) INTRAVENOUS CONTINUOUS PRN
Status: DISCONTINUED | OUTPATIENT
Start: 2022-07-01 | End: 2022-07-01

## 2022-07-01 RX ORDER — PROPOFOL 10 MG/ML
VIAL (ML) INTRAVENOUS
Status: DISCONTINUED | OUTPATIENT
Start: 2022-07-01 | End: 2022-07-01

## 2022-07-01 RX ORDER — BACITRACIN ZINC 500 UNIT/G
OINTMENT (GRAM) TOPICAL
Status: DISCONTINUED | OUTPATIENT
Start: 2022-07-01 | End: 2022-07-01 | Stop reason: HOSPADM

## 2022-07-01 RX ORDER — FENTANYL CITRATE 50 UG/ML
100 INJECTION, SOLUTION INTRAMUSCULAR; INTRAVENOUS
Status: DISCONTINUED | OUTPATIENT
Start: 2022-07-01 | End: 2022-07-01 | Stop reason: HOSPADM

## 2022-07-01 RX ORDER — LIDOCAINE HYDROCHLORIDE 20 MG/ML
INJECTION INTRAVENOUS
Status: DISCONTINUED | OUTPATIENT
Start: 2022-07-01 | End: 2022-07-01

## 2022-07-01 RX ADMIN — PROPOFOL 60 MG: 10 INJECTION, EMULSION INTRAVENOUS at 10:07

## 2022-07-01 RX ADMIN — BUPIVACAINE HYDROCHLORIDE AND EPINEPHRINE BITARTRATE 20 ML: 5; .0091 INJECTION, SOLUTION EPIDURAL; INTRACAUDAL; PERINEURAL at 09:07

## 2022-07-01 RX ADMIN — PROPOFOL 50 MCG/KG/MIN: 10 INJECTION, EMULSION INTRAVENOUS at 10:07

## 2022-07-01 RX ADMIN — MIDAZOLAM 2 MG: 1 INJECTION INTRAMUSCULAR; INTRAVENOUS at 09:07

## 2022-07-01 RX ADMIN — LIDOCAINE HYDROCHLORIDE 75 MG: 20 INJECTION, SOLUTION INTRAVENOUS at 10:07

## 2022-07-01 RX ADMIN — ACETAMINOPHEN 1000 MG: 500 TABLET ORAL at 08:07

## 2022-07-01 RX ADMIN — MEPIVACAINE HYDROCHLORIDE 10 ML: 15 INJECTION, SOLUTION EPIDURAL; INFILTRATION at 09:07

## 2022-07-01 RX ADMIN — FENTANYL CITRATE 100 MCG: 50 INJECTION INTRAMUSCULAR; INTRAVENOUS at 09:07

## 2022-07-01 RX ADMIN — ONDANSETRON 4 MG: 2 INJECTION INTRAMUSCULAR; INTRAVENOUS at 10:07

## 2022-07-01 RX ADMIN — DEXAMETHASONE SODIUM PHOSPHATE 4 MG: 4 INJECTION, SOLUTION INTRAMUSCULAR; INTRAVENOUS at 12:07

## 2022-07-01 RX ADMIN — SODIUM CHLORIDE: 9 INJECTION, SOLUTION INTRAVENOUS at 08:07

## 2022-07-01 RX ADMIN — CEFAZOLIN 2 G: 330 INJECTION, POWDER, FOR SOLUTION INTRAMUSCULAR; INTRAVENOUS at 10:07

## 2022-07-01 NOTE — PROGRESS NOTES
Security, anesthesia, nurses, and CC, spoke with patient on multiple occasions about leaving.     Patient adamantly refuses and continues to become argumentative and belligerent.

## 2022-07-01 NOTE — TRANSFER OF CARE
"Anesthesia Transfer of Care Note    Patient: Thomas Caal    Procedure(s) Performed: Procedure(s) (LRB):  ORIF, FRACTURE, RADIUS, DISTAL-Accumed Styloid Plates (Right)    Patient location: PACU    Anesthesia Type: general    Transport from OR: Transported from OR on 100% O2 by closed face mask with adequate spontaneous ventilation    Post pain: adequate analgesia    Post assessment: no apparent anesthetic complications and tolerated procedure well    Post vital signs: stable    Level of consciousness: sedated and responds to stimulation    Nausea/Vomiting: no nausea/vomiting    Complications: none    Transfer of care protocol was followed      Last vitals:   Visit Vitals  BP (!) 117/59 (BP Location: Left arm, Patient Position: Sitting)   Pulse (!) 48   Temp 36.7 °C (98.1 °F) (Oral)   Resp 16   Ht 6' 1" (1.854 m)   Wt 79.4 kg (175 lb)   SpO2 99%   BMI 23.09 kg/m²     "

## 2022-07-01 NOTE — PROGRESS NOTES
Patient ride hasn't arrived. Tried contacting ride/girlfriend. In meantime, patient threatening to leave. Advised patient he cannot due to anesthesia. Notified charge nurse, CC, and director.

## 2022-07-01 NOTE — PROGRESS NOTES
"CC/Charge nurse orchestrated medical transportation. Explained to patient he was not to drive home.     Patient advised and educated he is not to leave against medical advise, and HAS TO LEAVE in transportation that was set up.       Patient continuously refuses to get into ride. States when he gets out of this "[employer] building I'm gonna do what the F*k I want"    Advised patient multiple times that if he leaves, it would be against medical advice and against the law.   "

## 2022-07-01 NOTE — ANESTHESIA PREPROCEDURE EVALUATION
07/01/2022  Thomas Caal is a 42 y.o., male.      Pre-op Assessment    I have reviewed the Patient Summary Reports.     I have reviewed the Nursing Notes.       Review of Systems  Anesthesia Hx:  Denies Hx of Anesthetic complications    Social:  Smoker    Cardiovascular:  Cardiovascular Normal Exercise tolerance: good     Pulmonary:  Pulmonary Normal    Renal/:  Renal/ Normal     Hepatic/GI:  Hepatic/GI Normal    Neurological:  Neurology Normal    Endocrine:  Endocrine Normal        Physical Exam  General: Well nourished    Airway:  Mallampati: I   TM Distance: Normal  Neck ROM: Normal ROM    Chest/Lungs:  Normal Respiratory Rate    Heart:  Rate: Normal        Anesthesia Plan  Type of Anesthesia, risks & benefits discussed:    Anesthesia Type: Gen Natural Airway  Intra-op Monitoring Plan: Standard ASA Monitors  Post Op Pain Control Plan: multimodal analgesia, IV/PO Opioids PRN and peripheral nerve block  Induction:  IV  Informed Consent: Informed consent signed with the Patient and all parties understand the risks and agree with anesthesia plan.  All questions answered.   ASA Score: 2  Anesthesia Plan Notes: The patient's PMH was reviewed and PE was performed  Plan for GA and natural airway. Will convert to secured airway if needed      Ready For Surgery From Anesthesia Perspective.     .

## 2022-07-01 NOTE — PLAN OF CARE
Patient left facility with transportation arranged per JOEY Rabago. security Brian, Stu, GLADYS, and JOEY Rabago present for patient getting into vehicle and leaving premises.

## 2022-07-01 NOTE — ANESTHESIA PROCEDURE NOTES
Supraclavicular Brachial Plexus Single Injection Block    Patient location during procedure: pre-op   Block not for primary anesthetic.  Reason for block: at surgeon's request and post-op pain management   Post-op Pain Location: Right wrist pain   Start time: 7/1/2022 9:16 AM  Timeout: 7/1/2022 9:14 AM   End time: 7/1/2022 9:19 AM    Staffing  Authorizing Provider: Donald Gold MD  Performing Provider: Donald Gold MD    Preanesthetic Checklist  Completed: patient identified, IV checked, site marked, risks and benefits discussed, surgical consent, monitors and equipment checked, pre-op evaluation and timeout performed  Peripheral Block  Patient position: supine  Prep: ChloraPrep  Patient monitoring: heart rate, cardiac monitor, continuous pulse ox, continuous capnometry and frequent blood pressure checks  Block type: supraclavicular  Laterality: right  Injection technique: single shot  Needle  Needle type: Stimuplex   Needle gauge: 22 G  Needle length: 2 in  Needle localization: anatomical landmarks and ultrasound guidance   -ultrasound image captured on disc.  Assessment  Injection assessment: negative aspiration, negative parasthesia and local visualized surrounding nerve  Paresthesia pain: none  Heart rate change: no  Slow fractionated injection: yes  Pain Tolerance: comfortable throughout block and no complaints  Medications:    Medications: mepivacaine (CARBOCAINE) injection 15 mg/mL (1.5%) - Perineural   10 mL - 7/1/2022 9:23:00 AM  bupivacaine 0.5%-EPINEPHrine 1:200,000 injection - Perineural   20 mL - 7/1/2022 9:23:00 AM    Additional Notes  VSS.  [facility] RN monitoring vitals throughout procedure.  Patient tolerated procedure well.

## 2022-07-01 NOTE — PROGRESS NOTES
Called patient's girlfriend for patient d/c. She stated she will send her daughter to pick patient up. Advised her to let us know when she arrives by calling patient's cell phone. Verbalized understanding.

## 2022-07-01 NOTE — BRIEF OP NOTE
[facility] - Surgery (Hospital)  Brief Operative Note    Surgery Date: 7/1/2022     Surgeon(s) and Role:     * Cathie Finch MD - Primary    Assisting Surgeon: None    Pre-op Diagnosis:  Type I or II open fracture of distal end of right radius, unspecified fracture morphology, initial encounter [S52.501B]    Post-op Diagnosis:  Post-Op Diagnosis Codes:     * Type I or II open fracture of distal end of right radius, unspecified fracture morphology, initial encounter [S52.501B]    Procedure(s) (LRB):  ORIF, FRACTURE, RADIUS, DISTAL-Accumed Styloid Plates (Right)    Anesthesia: Regional    Operative Findings: See op note    Estimated Blood Loss: * No values recorded between 7/1/2022 10:34 AM and 7/1/2022 12:27 PM *         Specimens:   Specimen (24h ago, onward)            None            Discharge Note    OUTCOME: Patient tolerated treatment/procedure well without complication and is now ready for discharge.    DISPOSITION: Home or Self Care    FINAL DIAGNOSIS:    Problem List Items Addressed This Visit        Orthopedic    Open fracture of distal end of right radius    Relevant Orders    Place in Outpatient (Completed)    Vital signs    Place NATHANIEL hose    Place sequential compression device    Full code    Insert peripheral IV    Cleanse with Chlorhexidine (CHG)    Diet NPO    Diet general    Call MD for:  temperature >100.4    Call MD for:  persistent nausea and vomiting    Call MD for:  severe uncontrolled pain    Call MD for:  difficulty breathing, headache or visual disturbances    Call MD for:  redness, tenderness, or signs of infection (pain, swelling, redness, odor or green/yellow discharge around incision site)    Call MD for:  hives    Call MD for:  persistent dizziness or light-headedness    Call MD for:  extreme fatigue    Non weight bearing    Leave dressing on - Keep it clean, dry, and intact until clinic visit            FOLLOWUP: In clinic    DISCHARGE INSTRUCTIONS:    Discharge Procedure Orders    Diet general     Call MD for:  temperature >100.4     Call MD for:  persistent nausea and vomiting     Call MD for:  severe uncontrolled pain     Call MD for:  difficulty breathing, headache or visual disturbances     Call MD for:  redness, tenderness, or signs of infection (pain, swelling, redness, odor or green/yellow discharge around incision site)     Call MD for:  hives     Call MD for:  persistent dizziness or light-headedness     Call MD for:  extreme fatigue     Leave dressing on - Keep it clean, dry, and intact until clinic visit     Non weight bearing

## 2022-07-01 NOTE — PROGRESS NOTES
Patient left department with security, PCT, CC, and Anesthesia MD.    Report given to Charge Nurse.

## 2022-07-01 NOTE — ANESTHESIA POSTPROCEDURE EVALUATION
Anesthesia Post Evaluation    Patient: Thomas Caal    Procedure(s) Performed: Procedure(s) (LRB):  ORIF, FRACTURE, RADIUS, DISTAL-Accumed Styloid Plates (Right)    Final Anesthesia Type: general      Patient location during evaluation: PACU  Patient participation: Yes- Able to Participate  Level of consciousness: awake and alert and oriented  Post-procedure vital signs: reviewed and stable  Pain management: adequate  Airway patency: patent    PONV status at discharge: No PONV  Anesthetic complications: no      Cardiovascular status: hemodynamically stable  Respiratory status: unassisted, spontaneous ventilation and room air  Hydration status: euvolemic  Follow-up not needed.          Vitals Value Taken Time   /70 07/01/22 1302   Temp 36.5 °C (97.7 °F) 07/01/22 1300   Pulse 49 07/01/22 1308   Resp 18 07/01/22 1308   SpO2 97 % 07/01/22 1308   Vitals shown include unvalidated device data.      Event Time   Out of Recovery 13:15:00         Pain/Maryanne Score: Pain Rating Prior to Med Admin: 4 (7/1/2022  9:14 AM)  Maryanne Score: 9 (7/1/2022  1:00 PM)    Pt extremely uncooperative and confrontational.  Wanted to drive himself home.  Explained multiple times that he was impaired from surgery and anesthesia and that it was not safe to drive.  Nursing staff made arrangements for ride home with medical transport.  Pt escorted and assisted into transport vehicle for ride home.

## 2022-07-01 NOTE — PROGRESS NOTES
Patient's girlfriend Ms Kong called confirming ride set up. Let her know of plan of set up transportation. She verbalized understanding.     However, patient did not agree with set up transportation and continues to not cooperate.

## 2022-07-05 NOTE — OP NOTE
[facility] Surgery ([facility])  Surgery Department  Operative Note    SUMMARY     Date of Procedure: 7/1/2022     Procedure: Procedure(s) (LRB):  ORIF, FRACTURE, RADIUS, DISTAL-Accumed Styloid Plates (Right)  intra-articular 2 part  Irrigation and debridement of right radial styloid that was open, exploration of radial sensory branch nerve  Surgeon(s) and Role:     * Cathie Finch MD - Primary    Assisting Surgeon: Joseluis WYNN    Pre-Operative Diagnosis: Type I or II open fracture of distal end of right radius, unspecified fracture morphology, initial encounter [S52.501B]    Post-Operative Diagnosis: Post-Op Diagnosis Codes:     * Type I or II open fracture of distal end of right radius, unspecified fracture morphology, initial encounter [S52.501B]    Anesthesia: Regional    Technical Procedures Used: surgery    Description of the Findings of the Procedure:  Indication for procedure Mr. Taylor is a 42-year-old male who sustained a laceration through the radial side of his wrist he sustained a coronal split of his radial styloid as well as an open injury it was irrigated sutured in the ER splinted he was referred to my clinic after CT    Evaluation of the coronal split of the styloid I do feel operative intervention is deemed necessary risks and benefits were explained to the patient in clinic consents were signed in clinic    Procedure in detail the correct site was marked with the patient's participation in the holding area patient underwent regional anesthesia he was fairly swollen preoperatively and therefore no catheter was utilized patient was brought to the operating placed supine position underwent MAC anesthesia well-padded nonsterile tourniquet was placed on the right upper extremity the right upper extremity was prepped draped normal sterile fashion the sutures were removed the arm was exsanguinated an Esmarch tourniquet was insufflated 250 mmHg the original wound was opened up and is extensile the  radial sensory branch was evaluated there was a small laceration for 1 of the branches however the main branch was intact this was gently retracted out away the fracture could easily be seen from this portal it was irrigated with copious amounts normal saline debrided sharply with a curette K-wire was introduced to hold the fracture fragment based on where this split occurred and not think a radial styloid plate her Accu-Cheks plate screws would be amenable he needed more compression and therefore a FCR approach was made space a prone was elevated and a Accu Med plate was placed to compress this fracture which it did under C-arm fluoroscopy the screws were drilled and filled appropriately 1st compression then locking the shaft screws were also introduced once this was then placed the wrists placed through range of motion under x-ray showed that this fracture was now stable the articular surface was well examined in showed that it was also stable patient had full flexion full extension the areas were irrigated copious amounts normal saline Vicryl Monocryl Dermabond closed the skin sterile dressing was applied tourniquet was deflated brisk cap refill sued patient was placed in a well-padded splint tolerated suture was brought to cover area in stable condition    Postop plans patient keep the dressing clean dry intact will see the patient back in 2 weeks wound check brace to be placed therapy to be initiated nonweightbearing for 6 weeks    Significant Surgical Tasks Conducted by the Assistant(s), if Applicable: retraction    Complications: No    Estimated Blood Loss (EBL): * No values recorded between 7/1/2022 10:34 AM and 7/1/2022 12:27 PM *           Implants:   Implant Name Type Inv. Item Serial No.  Lot No. LRB No. Used Action   GUIDEWIRE ORTHO .054 X 6 IN - [id number]  GUIDEWIRE ORTHO .054 X 6 IN  [employer]  Right 2 Implanted and Explanted   PLATE BONE DST VOLAR RAD STND - [id number]  PLATE BONE DST  VOLAR RAD STND  [employer]  Right 1 Implanted   SCREW BONE CORTICAL THRD 2.3X2 - [id number]  SCREW BONE CORTICAL THRD 2.3X2  [employer]  Right 1 Implanted and Explanted   SCREW BONE CORTICAL THRD 2.3X2 - [id number]  SCREW BONE CORTICAL THRD 2.3X2  [employer]  Right 1 Implanted and Explanted   SCREW BONE MARTHA EULALIO 2.3X22MM - [id number]  SCREW BONE MARTHA EULALIO 2.3X22MM  [employer]  Right 3 Implanted   SCREW BONE MARTHA EULALIO 2.3X20MM - [id number]  SCREW BONE MARTHA EULALIO 2.3X20MM  [employer]  Right 2 Implanted   SCREW BONE NL HEXALOBE 3.5 X 1 - [id number]  SCREW BONE NL HEXALOBE 3.5 X 1  [employer]  Right 2 Implanted   SCREW BONE NLHEXALOBE 3.5 X 18 - [id number]  SCREW BONE NLHEXALOBE 3.5 X 18  [employer]  Right 1 Implanted   2.3mm X 22 VARIABLE ANGLE SCREW    [employer]  Right 1 Implanted   SCREW BONE MARTHA EULALIO 2.3X22MM - [id number]  SCREW BONE MARTHA EULALIO 2.3X22MM  [employer]  Right 2 Implanted and Explanted       Specimens:   Specimen (24h ago, onward)            None                  Condition: Good    Disposition: PACU - hemodynamically stable.    Attestation: I performed the procedure.    Discharge Note    SUMMARY     Admit Date: 7/1/2022    Discharge Date and Time: 7/1/2022  3:33 PM    Hospital Course (synopsis of major diagnoses, care, treatment, and services provided during the course of the hospital stay): surgery     Final Diagnosis: Post-Op Diagnosis Codes:     * Type I or II open fracture of distal end of right radius, unspecified fracture morphology, initial encounter [S52.501B]    Disposition: Home or Self Care    Follow Up/Patient Instructions:     Medications:  Reconciled Home Medications:      Medication List      CONTINUE taking these medications    acetaminophen 500 MG tablet  Commonly known as: TYLENOL  Take 2 tablets (1,000 mg total) by mouth every 12 (twelve) hours as needed for Pain (starting on post-op day 2).     HYDROcodone-acetaminophen 5-325 mg per tablet  Commonly known as: NORCO  Take 1 tablet by mouth  every 6 (six) hours as needed for Pain.     ibuprofen 600 MG tablet  Commonly known as: ADVIL,MOTRIN  Take 1 tablet (600 mg total) by mouth 3 (three) times daily.     oxyCODONE-acetaminophen 5-325 mg per tablet  Commonly known as: PERCOCET  Take 1 tablet by mouth every 4 to 6 hours as needed for Pain (moderate to severe).        ASK your doctor about these medications    sulfamethoxazole-trimethoprim 800-160mg 800-160 mg Tab  Commonly known as: BACTRIM DS  Take 1 tablet by mouth 2 (two) times daily. for 7 days  Ask about: Should I take this medication?          Discharge Procedure Orders   Diet general     Call MD for:  temperature >100.4     Call MD for:  persistent nausea and vomiting     Call MD for:  severe uncontrolled pain     Call MD for:  difficulty breathing, headache or visual disturbances     Call MD for:  redness, tenderness, or signs of infection (pain, swelling, redness, odor or green/yellow discharge around incision site)     Call MD for:  hives     Call MD for:  persistent dizziness or light-headedness     Call MD for:  extreme fatigue     Leave dressing on - Keep it clean, dry, and intact until clinic visit     Non weight bearing

## 2022-07-14 ENCOUNTER — TELEPHONE (OUTPATIENT)
Dept: ORTHOPEDICS | Facility: CLINIC | Age: 42
End: 2022-07-14
Payer: MEDICAID

## 2022-07-15 ENCOUNTER — TELEPHONE (OUTPATIENT)
Dept: ORTHOPEDICS | Facility: CLINIC | Age: 42
End: 2022-07-15
Payer: MEDICAID

## 2022-07-15 NOTE — TELEPHONE ENCOUNTER
Attempted to contact this patient by phone. Left VM for pt to return a call to clinic regarding previous incoming phone call/ patient portal message.     ----- Message from Reggie Navarrete sent at 7/15/2022 12:38 PM CDT -----  Regarding: Appt  Contact: [phone]  Patient is calling to reschedule appt. Please contact pt

## 2022-07-18 ENCOUNTER — TELEPHONE (OUTPATIENT)
Dept: ORTHOPEDICS | Facility: CLINIC | Age: 42
End: 2022-07-18
Payer: MEDICAID

## 2022-07-18 ENCOUNTER — DOCUMENTATION ONLY (OUTPATIENT)
Dept: ORTHOPEDICS | Facility: CLINIC | Age: 42
End: 2022-07-18
Payer: MEDICAID

## 2022-07-18 NOTE — TELEPHONE ENCOUNTER
Tried calling pt to confirm appt again no answer vm has not been setup yet. Also called pt's emergency contact no answer as well

## 2022-07-18 NOTE — TELEPHONE ENCOUNTER
Spoke with pt got him r/s to 7/20/22 informed him that its very important to keep this appt due to his sutures needs to be removed. Pt voiced understanding and call ended.

## 2022-07-20 ENCOUNTER — TELEPHONE (OUTPATIENT)
Dept: ORTHOPEDICS | Facility: CLINIC | Age: 42
End: 2022-07-20
Payer: MEDICAID

## 2022-07-20 ENCOUNTER — OFFICE VISIT (OUTPATIENT)
Dept: ORTHOPEDICS | Facility: CLINIC | Age: 42
End: 2022-07-20
Payer: MEDICAID

## 2022-07-20 VITALS — HEIGHT: 73 IN | WEIGHT: 175 LBS | BODY MASS INDEX: 23.19 KG/M2

## 2022-07-20 DIAGNOSIS — Z98.890 POST-OPERATIVE STATE: Primary | ICD-10-CM

## 2022-07-20 PROCEDURE — 1159F PR MEDICATION LIST DOCUMENTED IN MEDICAL RECORD: ICD-10-PCS | Mod: CPTII,,, | Performed by: PHYSICIAN ASSISTANT

## 2022-07-20 PROCEDURE — 3008F PR BODY MASS INDEX (BMI) DOCUMENTED: ICD-10-PCS | Mod: CPTII,,, | Performed by: PHYSICIAN ASSISTANT

## 2022-07-20 PROCEDURE — 99999 PR PBB SHADOW E&M-EST. PATIENT-LVL III: CPT | Mod: PBBFAC,,, | Performed by: PHYSICIAN ASSISTANT

## 2022-07-20 PROCEDURE — 1159F MED LIST DOCD IN RCRD: CPT | Mod: CPTII,,, | Performed by: PHYSICIAN ASSISTANT

## 2022-07-20 PROCEDURE — 3008F BODY MASS INDEX DOCD: CPT | Mod: CPTII,,, | Performed by: PHYSICIAN ASSISTANT

## 2022-07-20 PROCEDURE — 99213 OFFICE O/P EST LOW 20 MIN: CPT | Mod: PBBFAC | Performed by: PHYSICIAN ASSISTANT

## 2022-07-20 PROCEDURE — 99024 PR POST-OP FOLLOW-UP VISIT: ICD-10-PCS | Mod: ,,, | Performed by: PHYSICIAN ASSISTANT

## 2022-07-20 PROCEDURE — 99999 PR PBB SHADOW E&M-EST. PATIENT-LVL III: ICD-10-PCS | Mod: PBBFAC,,, | Performed by: PHYSICIAN ASSISTANT

## 2022-07-20 PROCEDURE — 99024 POSTOP FOLLOW-UP VISIT: CPT | Mod: ,,, | Performed by: PHYSICIAN ASSISTANT

## 2022-07-20 RX ORDER — TRAMADOL HYDROCHLORIDE 50 MG/1
50 TABLET ORAL EVERY 6 HOURS PRN
Qty: 15 TABLET | Refills: 0 | Status: SHIPPED | OUTPATIENT
Start: 2022-07-20

## 2022-07-20 NOTE — TELEPHONE ENCOUNTER
Called pt aging to ask if he was still on his way to his appt for today pt states he went to the wrong location informed pt he still needs to be seen today.

## 2022-07-20 NOTE — PROGRESS NOTES
"Mr. Caal is here today for a post-operative visit.  He is 19 days status post ORIF right distal radius, irrigation and debridement of right radial styloid that was open, exploration of radial sensory branch by Dr. Finch on 7/1/22. He reports that he is doing well. He has intermittent pain, he has been taking tylenol as needed with some relief. He denies fever, chills, and sweats since the time of the surgery. Per MA, his dressing was loose and unravelled today.    Physical exam:    Vitals:    07/20/22 1128   Weight: 79.4 kg (175 lb)   Height: 6' 1" (1.854 m)   PainSc:   4     Vital signs are stable, patient is afebrile.  Patient is well dressed and well groomed, no acute distress.  Alert and oriented to person, place, and time.  Post op dressing taken down.  Incision is clean, dry and intact.  There is no erythema or exudate.  There is no sign of any infection. He is NVI. Sutures removed without difficulty.  Minimal finger stiffness. He reports minimal decreased sensation at the dorsal and volar first metacarpal region.    Assessment: status post ORIF right distal radius, irrigation and debridement of right radial styloid that was open, exploration of radial sensory branch by Dr. Finch on 7/1/22    Plan:  Thomas was seen today for post-op evaluation and pain.    Diagnoses and all orders for this visit:    Post-operative state  -     Ambulatory referral/consult to Physical/Occupational Therapy; Future    Other orders  -     traMADoL (ULTRAM) 50 mg tablet; Take 1 tablet (50 mg total) by mouth every 6 (six) hours as needed for Pain.    PO instruction reviewed and provided to patient  Transition to thumb spica brace for full time use   No lifting or weight bearing   OT ordered, needs to begin this soon   RTC 4 wks with xray      15 min preparing/educating/fitting on brace.  "

## 2022-07-22 ENCOUNTER — TELEPHONE (OUTPATIENT)
Dept: ORTHOPEDICS | Facility: CLINIC | Age: 42
End: 2022-07-22
Payer: MEDICAID

## 2022-07-29 ENCOUNTER — TELEPHONE (OUTPATIENT)
Dept: ORTHOPEDICS | Facility: CLINIC | Age: 42
End: 2022-07-29
Payer: MEDICAID

## 2022-07-29 NOTE — TELEPHONE ENCOUNTER
----- Message from Marietta Wood PA-C sent at 7/29/2022  4:03 PM CDT -----  Regarding: FW: 2 No-Show Scheduled evaluations  Can you call pt today or mon and let him know he needs to call and re-schedule therapy. He needs to make his therapy appt. It is very important for his post operative outcome.    ----- Message -----  From: Terri Nettles OT  Sent: 7/29/2022   3:39 PM CDT  To: Marietta Wood PA-C  Subject: 2 No-Show Scheduled evaluations                  Dale Mcmanus,    Just to keep you apprised. Mr. Caal no-showed for his 1st evaluation scheduled this week on Wednesday. I spoke with him yesterday to reschedule for today. He said he had car trouble. He no-showed again today.    Thanks,  Terri

## 2022-07-29 NOTE — TELEPHONE ENCOUNTER
Contacted patient advised per Marietta he needs to call and re-schedule therapy. He needs to make his therapy appt. It is very important for his post operative outcome.Pt voiced understanding and stated he will call to reschedule appointment

## 2022-08-17 ENCOUNTER — DOCUMENTATION ONLY (OUTPATIENT)
Dept: ORTHOPEDICS | Facility: CLINIC | Age: 42
End: 2022-08-17
Payer: MEDICAID

## (undated) DEVICE — Device

## (undated) DEVICE — BUCKET PLASTER DISPOSABLE

## (undated) DEVICE — DRILL QUICK RELEASE 2.8MM 5IN

## (undated) DEVICE — SUT MONOCRYL 4-0 UD P-3 18

## (undated) DEVICE — GAUZE SPONGE 4X4 12PLY

## (undated) DEVICE — PAD CAST 2 IN X 4YDS STERILE

## (undated) DEVICE — CORD FOR BIPOLAR FORCEPS 12

## (undated) DEVICE — BIT DRILL QUICK RELEASE 2.0MM

## (undated) DEVICE — SLING ARM LARGE FOAM STRAP

## (undated) DEVICE — BANDAGE ACE NON LATEX 2IN

## (undated) DEVICE — SCREW BONE CORTICAL THRD 2.3X2
Type: IMPLANTABLE DEVICE | Site: ARM | Status: NON-FUNCTIONAL
Removed: 2022-07-01

## (undated) DEVICE — SUT VICRYL 4-0 18 P-3

## (undated) DEVICE — BANDAGE MATRIX HK LOOP 2IN 5YD

## (undated) DEVICE — DRAPE SURG W/TWL 17 5/8X23

## (undated) DEVICE — GLOVE BIOGEL PI MICRO SZ 7

## (undated) DEVICE — GUIDEWIRE ORTHO .054 X 6 IN
Type: IMPLANTABLE DEVICE | Site: ARM | Status: NON-FUNCTIONAL
Removed: 2022-07-01

## (undated) DEVICE — DRAPE C-ARM MINI DISP

## (undated) DEVICE — BANDAGE MATRIX HK LOOP 4IN 5YD

## (undated) DEVICE — SOL 9P NACL IRR PIC IL

## (undated) DEVICE — TOURNIQUET SB QC DP 18X4IN

## (undated) DEVICE — SOL PVP-I SCRUB 7.5% 4OZ

## (undated) DEVICE — SUT CTD VICRYL 4-0 P-3 18IN

## (undated) DEVICE — SPONGE GAUZE 16PLY 4X4

## (undated) DEVICE — DRESSING N ADH OIL EMUL 3X3

## (undated) DEVICE — PAD CAST SPECIALIST STRL 4

## (undated) DEVICE — GLOVE BIOGEL PI MICRO INDIC 7

## (undated) DEVICE — SCREW BONE LOK CONG 2.3X22MM
Type: IMPLANTABLE DEVICE | Site: ARM | Status: NON-FUNCTIONAL
Removed: 2022-07-01

## (undated) DEVICE — PLASTER SPLNT FST SET 4X15 BLU